# Patient Record
Sex: FEMALE | Race: ASIAN | NOT HISPANIC OR LATINO | Employment: FULL TIME
[De-identification: names, ages, dates, MRNs, and addresses within clinical notes are randomized per-mention and may not be internally consistent; named-entity substitution may affect disease eponyms.]

---

## 2017-05-18 ENCOUNTER — HOSPITAL (OUTPATIENT)
Dept: OTHER | Age: 47
End: 2017-05-18
Attending: OBSTETRICS & GYNECOLOGY

## 2017-09-18 ENCOUNTER — PRIOR ORIGINAL RECORDS (OUTPATIENT)
Dept: OTHER | Age: 47
End: 2017-09-18

## 2018-09-21 ENCOUNTER — PRIOR ORIGINAL RECORDS (OUTPATIENT)
Dept: OTHER | Age: 48
End: 2018-09-21

## 2019-01-04 ENCOUNTER — PRIOR ORIGINAL RECORDS (OUTPATIENT)
Dept: OTHER | Age: 49
End: 2019-01-04

## 2019-01-15 ENCOUNTER — MYAURORA ACCOUNT LINK (OUTPATIENT)
Dept: OTHER | Age: 49
End: 2019-01-15

## 2019-01-15 ENCOUNTER — PRIOR ORIGINAL RECORDS (OUTPATIENT)
Dept: OTHER | Age: 49
End: 2019-01-15

## 2019-01-15 LAB
ALBUMIN: 4.2 G/DL
ALKALINE PHOSPHATATE(ALK PHOS): 76 IU/L
BILIRUBIN TOTAL: 0.3 MG/DL
BUN: 15 MG/DL
CALCIUM: 9.5 MG/DL
CHLORIDE: 101 MEQ/L
CREATININE, SERUM: 0.61 MG/DL
GLUCOSE: 153 MG/DL
POTASSIUM, SERUM: 3.8 MEQ/L
PROTEIN, TOTAL: 7 G/DL
SGOT (AST): 23 IU/L
SGPT (ALT): 34 IU/L
SODIUM: 138 MEQ/L

## 2019-01-21 LAB
ALBUMIN: 4.1 G/DL
ALKALINE PHOSPHATATE(ALK PHOS): 48 IU/L
BILIRUBIN TOTAL: 0.7 MG/DL
BUN: 11 MG/DL
CALCIUM: 9.1 MG/DL
CHLORIDE: 102 MEQ/L
CHOLESTEROL, TOTAL: 163 MG/DL
CHOLESTEROL, TOTAL: 216 MG/DL
CREATININE, SERUM: 0.68 MG/DL
GLUCOSE: 105 MG/DL
HDL CHOLESTEROL: 38 MG/DL
HDL CHOLESTEROL: 40 MG/DL
HEMATOCRIT: 41.7 %
HEMOGLOBIN: 14.5 G/DL
LDL CHOLESTEROL: 103 MG/DL
LDL CHOLESTEROL: 144 MG/DL
NON-HDL CHOLESTEROL: 123 MG/DL
NON-HDL CHOLESTEROL: 178 MG/DL
PLATELETS: 249 K/UL
POTASSIUM, SERUM: 3.8 MEQ/L
PROTEIN, TOTAL: 7 G/DL
RED BLOOD COUNT: 4.75 X 10-6/U
SGOT (AST): 24 IU/L
SGPT (ALT): 28 IU/L
SODIUM: 136 MEQ/L
TRIGLYCERIDES: 128 MG/DL
TRIGLYCERIDES: 215 MG/DL
WHITE BLOOD COUNT: 5 X 10-3/U

## 2019-01-22 ENCOUNTER — MYAURORA ACCOUNT LINK (OUTPATIENT)
Dept: OTHER | Age: 49
End: 2019-01-22

## 2019-01-23 ENCOUNTER — PRIOR ORIGINAL RECORDS (OUTPATIENT)
Dept: OTHER | Age: 49
End: 2019-01-23

## 2019-02-22 ENCOUNTER — PRIOR ORIGINAL RECORDS (OUTPATIENT)
Dept: OTHER | Age: 49
End: 2019-02-22

## 2019-02-26 LAB
ALT (SGPT): 40 U/L
AST (SGOT): 23 U/L
CHOLESTEROL, TOTAL: 197 MG/DL
HDL CHOLESTEROL: 48 MG/DL
LDL CHOLESTEROL: 124 MG/DL
TRIGLYCERIDES: 123 MG/DL

## 2019-02-28 ENCOUNTER — PRIOR ORIGINAL RECORDS (OUTPATIENT)
Dept: OTHER | Age: 49
End: 2019-02-28

## 2019-02-28 VITALS
DIASTOLIC BLOOD PRESSURE: 84 MMHG | WEIGHT: 160 LBS | HEART RATE: 76 BPM | BODY MASS INDEX: 27.46 KG/M2 | SYSTOLIC BLOOD PRESSURE: 130 MMHG

## 2019-03-07 VITALS
DIASTOLIC BLOOD PRESSURE: 70 MMHG | RESPIRATION RATE: 16 BRPM | BODY MASS INDEX: 25.95 KG/M2 | SYSTOLIC BLOOD PRESSURE: 110 MMHG | WEIGHT: 152 LBS | HEART RATE: 72 BPM | HEIGHT: 64 IN

## 2020-12-22 ENCOUNTER — IMAGING SERVICES (OUTPATIENT)
Dept: OTHER | Age: 50
End: 2020-12-22
Attending: INTERNAL MEDICINE

## 2020-12-22 LAB — HM MAMMOGRAPHY BILATERAL: NORMAL

## 2021-08-18 ENCOUNTER — TELEPHONE (OUTPATIENT)
Dept: SCHEDULING | Age: 51
End: 2021-08-18

## 2021-09-29 ENCOUNTER — APPOINTMENT (OUTPATIENT)
Dept: FAMILY MEDICINE | Age: 51
End: 2021-09-29

## 2021-10-06 ENCOUNTER — OFFICE VISIT (OUTPATIENT)
Dept: INTERNAL MEDICINE | Age: 51
End: 2021-10-06

## 2021-10-06 ENCOUNTER — TELEPHONE (OUTPATIENT)
Dept: SCHEDULING | Age: 51
End: 2021-10-06

## 2021-10-06 VITALS
RESPIRATION RATE: 16 BRPM | HEIGHT: 64 IN | BODY MASS INDEX: 26.8 KG/M2 | DIASTOLIC BLOOD PRESSURE: 82 MMHG | SYSTOLIC BLOOD PRESSURE: 122 MMHG | HEART RATE: 94 BPM | TEMPERATURE: 96.1 F | OXYGEN SATURATION: 96 % | WEIGHT: 157 LBS

## 2021-10-06 DIAGNOSIS — Z23 NEED FOR PNEUMOCOCCAL VACCINATION: ICD-10-CM

## 2021-10-06 DIAGNOSIS — Z12.11 SCREENING FOR COLORECTAL CANCER: ICD-10-CM

## 2021-10-06 DIAGNOSIS — Z12.31 ENCOUNTER FOR SCREENING MAMMOGRAM FOR MALIGNANT NEOPLASM OF BREAST: ICD-10-CM

## 2021-10-06 DIAGNOSIS — R53.83 FATIGUE, UNSPECIFIED TYPE: ICD-10-CM

## 2021-10-06 DIAGNOSIS — Z01.419 WELL WOMAN EXAM WITH ROUTINE GYNECOLOGICAL EXAM: ICD-10-CM

## 2021-10-06 DIAGNOSIS — F51.01 PRIMARY INSOMNIA: ICD-10-CM

## 2021-10-06 DIAGNOSIS — E78.9 LIPID DISORDER: ICD-10-CM

## 2021-10-06 DIAGNOSIS — Z12.12 SCREENING FOR COLORECTAL CANCER: ICD-10-CM

## 2021-10-06 DIAGNOSIS — Z23 INFLUENZA VACCINE NEEDED: Primary | ICD-10-CM

## 2021-10-06 PROCEDURE — 90472 IMMUNIZATION ADMIN EACH ADD: CPT

## 2021-10-06 PROCEDURE — 90471 IMMUNIZATION ADMIN: CPT

## 2021-10-06 PROCEDURE — 90686 IIV4 VACC NO PRSV 0.5 ML IM: CPT

## 2021-10-06 PROCEDURE — 99386 PREV VISIT NEW AGE 40-64: CPT | Performed by: INTERNAL MEDICINE

## 2021-10-06 PROCEDURE — 90732 PPSV23 VACC 2 YRS+ SUBQ/IM: CPT

## 2021-10-06 RX ORDER — PSEUDOEPHEDRINE HCL 30 MG
TABLET ORAL
COMMUNITY
End: 2021-12-15 | Stop reason: HOSPADM

## 2021-10-06 RX ORDER — FLUTICASONE PROPIONATE AND SALMETEROL 50; 250 UG/1; UG/1
1 POWDER RESPIRATORY (INHALATION)
Qty: 1 EACH | Refills: 11 | Status: SHIPPED | OUTPATIENT
Start: 2021-10-06 | End: 2022-02-16 | Stop reason: SDUPTHER

## 2021-10-06 RX ORDER — LOSARTAN POTASSIUM 50 MG/1
TABLET ORAL
COMMUNITY
Start: 2021-06-30 | End: 2021-10-06 | Stop reason: SDUPTHER

## 2021-10-06 RX ORDER — LEVALBUTEROL INHALATION SOLUTION 1.25 MG/3ML
SOLUTION RESPIRATORY (INHALATION)
COMMUNITY
End: 2021-12-15 | Stop reason: HOSPADM

## 2021-10-06 RX ORDER — CALCIUM CARBONATE 500(1250)
500 TABLET ORAL
COMMUNITY
Start: 2019-01-15 | End: 2021-12-15 | Stop reason: HOSPADM

## 2021-10-06 RX ORDER — HYDROCHLOROTHIAZIDE 12.5 MG/1
TABLET ORAL
COMMUNITY
End: 2021-12-15 | Stop reason: HOSPADM

## 2021-10-06 RX ORDER — SERTRALINE HYDROCHLORIDE 25 MG/1
TABLET, FILM COATED ORAL
COMMUNITY
Start: 2021-07-09 | End: 2021-12-15 | Stop reason: HOSPADM

## 2021-10-06 RX ORDER — ALBUTEROL SULFATE 90 UG/1
POWDER, METERED RESPIRATORY (INHALATION)
COMMUNITY

## 2021-10-06 RX ORDER — ALBUTEROL SULFATE 90 UG/1
1 AEROSOL, METERED RESPIRATORY (INHALATION) EVERY 4 HOURS PRN
Qty: 1 EACH | Refills: 11 | Status: SHIPPED | OUTPATIENT
Start: 2021-10-06 | End: 2021-12-15 | Stop reason: HOSPADM

## 2021-10-06 RX ORDER — MULTIVIT-MIN/IRON/FOLIC ACID/K 18-600-40
CAPSULE ORAL
COMMUNITY

## 2021-10-06 RX ORDER — ZALEPLON 5 MG/1
5 CAPSULE ORAL NIGHTLY
Qty: 30 CAPSULE | Refills: 3 | Status: SHIPPED | OUTPATIENT
Start: 2021-10-06 | End: 2022-04-29

## 2021-10-06 RX ORDER — FLUTICASONE PROPIONATE AND SALMETEROL 50; 250 UG/1; UG/1
POWDER RESPIRATORY (INHALATION)
COMMUNITY
Start: 2021-09-30 | End: 2021-10-06 | Stop reason: SDUPTHER

## 2021-10-06 RX ORDER — LOSARTAN POTASSIUM 50 MG/1
50 TABLET ORAL DAILY
Qty: 90 TABLET | Refills: 3 | Status: SHIPPED | OUTPATIENT
Start: 2021-10-06 | End: 2022-06-28 | Stop reason: SDUPTHER

## 2021-10-06 RX ORDER — PNV NO.95/FERROUS FUM/FOLIC AC 28MG-0.8MG
TABLET ORAL
COMMUNITY

## 2021-10-06 RX ORDER — ANTACID TABLETS 500 MG/1
TABLET, CHEWABLE ORAL
COMMUNITY
End: 2021-12-15 | Stop reason: HOSPADM

## 2021-10-06 RX ORDER — FLUTICASONE PROPIONATE AND SALMETEROL 250; 50 UG/1; UG/1
POWDER RESPIRATORY (INHALATION)
COMMUNITY
Start: 2019-01-15 | End: 2021-12-15 | Stop reason: HOSPADM

## 2021-10-06 RX ORDER — FLUTICASONE PROPIONATE 50 MCG
SPRAY, SUSPENSION (ML) NASAL
COMMUNITY
Start: 2019-01-15

## 2021-10-06 RX ORDER — AZITHROMYCIN 250 MG/1
TABLET, FILM COATED ORAL
Qty: 6 TABLET | Refills: 0 | Status: SHIPPED | OUTPATIENT
Start: 2021-10-06 | End: 2021-10-11

## 2021-10-06 RX ORDER — ZALEPLON 5 MG/1
CAPSULE ORAL
COMMUNITY
Start: 2021-07-06 | End: 2021-10-06 | Stop reason: SDUPTHER

## 2021-10-06 RX ORDER — LANOLIN ALCOHOL/MO/W.PET/CERES
CREAM (GRAM) TOPICAL
COMMUNITY

## 2021-10-06 RX ORDER — ALBUTEROL SULFATE 90 UG/1
AEROSOL, METERED RESPIRATORY (INHALATION)
COMMUNITY
Start: 2019-01-15 | End: 2021-10-06 | Stop reason: SDUPTHER

## 2021-10-06 ASSESSMENT — PATIENT HEALTH QUESTIONNAIRE - PHQ9
CLINICAL INTERPRETATION OF PHQ2 SCORE: NO FURTHER SCREENING NEEDED
SUM OF ALL RESPONSES TO PHQ9 QUESTIONS 1 AND 2: 0
2. FEELING DOWN, DEPRESSED OR HOPELESS: NOT AT ALL
CLINICAL INTERPRETATION OF PHQ9 SCORE: NO FURTHER SCREENING NEEDED
SUM OF ALL RESPONSES TO PHQ9 QUESTIONS 1 AND 2: 0
1. LITTLE INTEREST OR PLEASURE IN DOING THINGS: NOT AT ALL

## 2021-10-06 ASSESSMENT — PAIN SCALES - GENERAL: PAINLEVEL: 0

## 2021-10-08 ENCOUNTER — E-ADVICE (OUTPATIENT)
Dept: INTERNAL MEDICINE | Age: 51
End: 2021-10-08

## 2021-10-08 DIAGNOSIS — E55.9 VITAMIN D DEFICIENCY: Primary | ICD-10-CM

## 2021-12-15 ENCOUNTER — OFFICE VISIT (OUTPATIENT)
Dept: OBGYN | Age: 51
End: 2021-12-15

## 2021-12-15 VITALS
BODY MASS INDEX: 27.33 KG/M2 | DIASTOLIC BLOOD PRESSURE: 85 MMHG | SYSTOLIC BLOOD PRESSURE: 127 MMHG | WEIGHT: 160.05 LBS | HEART RATE: 75 BPM | HEIGHT: 64 IN

## 2021-12-15 DIAGNOSIS — Z01.419 GYNECOLOGIC EXAM NORMAL: Primary | ICD-10-CM

## 2021-12-15 DIAGNOSIS — Z80.41 FHX: OVARIAN CANCER: ICD-10-CM

## 2021-12-15 PROBLEM — J45.909 ASTHMA: Status: ACTIVE | Noted: 2021-12-15

## 2021-12-15 PROBLEM — G47.00 INSOMNIA: Status: ACTIVE | Noted: 2021-12-15

## 2021-12-15 PROBLEM — G47.30 SLEEP APNEA: Status: ACTIVE | Noted: 2021-12-15

## 2021-12-15 PROBLEM — I10 HYPERTENSION: Status: ACTIVE | Noted: 2021-12-15

## 2021-12-15 PROCEDURE — 99386 PREV VISIT NEW AGE 40-64: CPT | Performed by: OBSTETRICS & GYNECOLOGY

## 2021-12-15 PROCEDURE — 3074F SYST BP LT 130 MM HG: CPT | Performed by: OBSTETRICS & GYNECOLOGY

## 2021-12-15 PROCEDURE — 3079F DIAST BP 80-89 MM HG: CPT | Performed by: OBSTETRICS & GYNECOLOGY

## 2021-12-18 ENCOUNTER — HOSPITAL ENCOUNTER (OUTPATIENT)
Dept: MAMMOGRAPHY | Age: 51
Discharge: HOME | End: 2021-12-18
Attending: INTERNAL MEDICINE

## 2021-12-18 DIAGNOSIS — Z12.31 ENCOUNTER FOR SCREENING MAMMOGRAM FOR MALIGNANT NEOPLASM OF BREAST: ICD-10-CM

## 2021-12-18 PROCEDURE — 77063 BREAST TOMOSYNTHESIS BI: CPT

## 2021-12-26 ENCOUNTER — WALK IN (OUTPATIENT)
Dept: URGENT CARE | Age: 51
End: 2021-12-26

## 2021-12-26 VITALS
HEART RATE: 117 BPM | HEIGHT: 64 IN | DIASTOLIC BLOOD PRESSURE: 65 MMHG | OXYGEN SATURATION: 98 % | SYSTOLIC BLOOD PRESSURE: 140 MMHG | RESPIRATION RATE: 16 BRPM | TEMPERATURE: 99.3 F | WEIGHT: 157 LBS | BODY MASS INDEX: 26.8 KG/M2

## 2021-12-26 DIAGNOSIS — R50.9 FEVER AND CHILLS: ICD-10-CM

## 2021-12-26 DIAGNOSIS — R53.83 FATIGUE, UNSPECIFIED TYPE: ICD-10-CM

## 2021-12-26 DIAGNOSIS — R09.81 NASAL CONGESTION: ICD-10-CM

## 2021-12-26 DIAGNOSIS — U07.1 COVID-19 VIRUS DETECTED: Primary | ICD-10-CM

## 2021-12-26 DIAGNOSIS — Z20.818 EXPOSURE TO STREP THROAT: ICD-10-CM

## 2021-12-26 DIAGNOSIS — R05.9 COUGH: ICD-10-CM

## 2021-12-26 LAB
FLUAV AG UPPER RESP QL IA.RAPID: NEGATIVE
FLUBV AG UPPER RESP QL IA.RAPID: NEGATIVE
INTERNAL PROCEDURAL CONTROLS ACCEPTABLE: YES
S PYO AG THROAT QL IA.RAPID: NEGATIVE
SARS-COV+SARS-COV-2 AG RESP QL IA.RAPID: DETECTED

## 2021-12-26 PROCEDURE — 3077F SYST BP >= 140 MM HG: CPT | Performed by: NURSE PRACTITIONER

## 2021-12-26 PROCEDURE — 87880 STREP A ASSAY W/OPTIC: CPT | Performed by: NURSE PRACTITIONER

## 2021-12-26 PROCEDURE — 87426 SARSCOV CORONAVIRUS AG IA: CPT | Performed by: NURSE PRACTITIONER

## 2021-12-26 PROCEDURE — 99203 OFFICE O/P NEW LOW 30 MIN: CPT | Performed by: NURSE PRACTITIONER

## 2021-12-26 PROCEDURE — 3078F DIAST BP <80 MM HG: CPT | Performed by: NURSE PRACTITIONER

## 2021-12-26 PROCEDURE — 87804 INFLUENZA ASSAY W/OPTIC: CPT | Performed by: NURSE PRACTITIONER

## 2021-12-26 RX ORDER — BENZONATATE 100 MG/1
100 CAPSULE ORAL 3 TIMES DAILY PRN
Qty: 21 CAPSULE | Refills: 0 | Status: SHIPPED | OUTPATIENT
Start: 2021-12-26 | End: 2022-01-02

## 2021-12-26 ASSESSMENT — ENCOUNTER SYMPTOMS
CHILLS: 1
ABDOMINAL DISTENTION: 0
DIARRHEA: 0
COUGH: 1
ACTIVITY CHANGE: 0
RHINORRHEA: 0
DIZZINESS: 0
CHEST TIGHTNESS: 0
WEAKNESS: 0
NERVOUS/ANXIOUS: 0
VOMITING: 0
SORE THROAT: 0
SINUS PRESSURE: 0
BLOOD IN STOOL: 0
VOICE CHANGE: 0
EYE REDNESS: 0
FATIGUE: 1
SLEEP DISTURBANCE: 0
FEVER: 1
BACK PAIN: 0
TROUBLE SWALLOWING: 0
WHEEZING: 0
SHORTNESS OF BREATH: 0
HEADACHES: 0
LIGHT-HEADEDNESS: 0
ABDOMINAL PAIN: 0
NAUSEA: 0
APPETITE CHANGE: 0

## 2021-12-29 ENCOUNTER — APPOINTMENT (OUTPATIENT)
Dept: MAMMOGRAPHY | Age: 51
End: 2021-12-29
Attending: INTERNAL MEDICINE

## 2021-12-31 ENCOUNTER — TELEPHONE (OUTPATIENT)
Dept: SCHEDULING | Age: 51
End: 2021-12-31

## 2022-01-19 DIAGNOSIS — Z11.59 SCREENING EXAMINATION FOR OTHER ARTHROPOD-BORNE VIRAL DISEASES: Primary | ICD-10-CM

## 2022-01-21 ENCOUNTER — LAB SERVICES (OUTPATIENT)
Dept: LAB | Age: 52
End: 2022-01-21

## 2022-01-21 DIAGNOSIS — E78.9 LIPID DISORDER: Primary | ICD-10-CM

## 2022-01-21 LAB
25(OH)D3+25(OH)D2 SERPL-MCNC: 44.4 NG/ML (ref 30–100)
ALBUMIN SERPL-MCNC: 3.7 G/DL (ref 3.6–5.1)
ALBUMIN/GLOB SERPL: 1 {RATIO} (ref 1–2.4)
ALP SERPL-CCNC: 90 UNITS/L (ref 45–117)
ALT SERPL-CCNC: 32 UNITS/L
ANION GAP SERPL CALC-SCNC: 8 MMOL/L (ref 10–20)
AST SERPL-CCNC: 16 UNITS/L
BILIRUB SERPL-MCNC: 0.5 MG/DL (ref 0.2–1)
BUN SERPL-MCNC: 14 MG/DL (ref 6–20)
BUN/CREAT SERPL: 21 (ref 7–25)
CALCIUM SERPL-MCNC: 9 MG/DL (ref 8.4–10.2)
CHLORIDE SERPL-SCNC: 106 MMOL/L (ref 98–107)
CHOLEST SERPL-MCNC: 205 MG/DL
CHOLEST/HDLC SERPL: 4.2 {RATIO}
CO2 SERPL-SCNC: 28 MMOL/L (ref 21–32)
CREAT SERPL-MCNC: 0.68 MG/DL (ref 0.51–0.95)
FASTING DURATION TIME PATIENT: ABNORMAL H
FASTING DURATION TIME PATIENT: ABNORMAL H
GFR SERPLBLD BASED ON 1.73 SQ M-ARVRAT: >90 ML/MIN
GLOBULIN SER-MCNC: 3.6 G/DL (ref 2–4)
GLUCOSE SERPL-MCNC: 103 MG/DL (ref 70–99)
HDLC SERPL-MCNC: 49 MG/DL
LDLC SERPL CALC-MCNC: 119 MG/DL
NONHDLC SERPL-MCNC: 156 MG/DL
POTASSIUM SERPL-SCNC: 3.6 MMOL/L (ref 3.4–5.1)
PROT SERPL-MCNC: 7.3 G/DL (ref 6.4–8.2)
SODIUM SERPL-SCNC: 138 MMOL/L (ref 135–145)
TRIGL SERPL-MCNC: 183 MG/DL

## 2022-01-21 PROCEDURE — 36415 COLL VENOUS BLD VENIPUNCTURE: CPT | Performed by: PSYCHIATRY & NEUROLOGY

## 2022-01-21 PROCEDURE — 82306 VITAMIN D 25 HYDROXY: CPT | Performed by: PSYCHIATRY & NEUROLOGY

## 2022-01-21 PROCEDURE — 80053 COMPREHEN METABOLIC PANEL: CPT | Performed by: PSYCHIATRY & NEUROLOGY

## 2022-01-21 PROCEDURE — 80061 LIPID PANEL: CPT | Performed by: PSYCHIATRY & NEUROLOGY

## 2022-01-26 ENCOUNTER — EXTERNAL RECORD (OUTPATIENT)
Dept: HEALTH INFORMATION MANAGEMENT | Facility: OTHER | Age: 52
End: 2022-01-26

## 2022-01-26 PROCEDURE — 88305 TISSUE EXAM BY PATHOLOGIST: CPT | Performed by: CLINICAL MEDICAL LABORATORY

## 2022-01-27 ENCOUNTER — LAB REQUISITION (OUTPATIENT)
Dept: LAB | Age: 52
End: 2022-01-27

## 2022-01-27 DIAGNOSIS — Z12.11 ENCOUNTER FOR SCREENING FOR MALIGNANT NEOPLASM OF COLON: ICD-10-CM

## 2022-01-31 LAB
ASR DISCLAIMER: NORMAL
CASE RPRT: NORMAL
CLINICAL INFO: NORMAL
PATH REPORT.FINAL DX SPEC: NORMAL
PATH REPORT.GROSS SPEC: NORMAL

## 2022-02-16 RX ORDER — FLUTICASONE PROPIONATE AND SALMETEROL 50; 250 UG/1; UG/1
1 POWDER RESPIRATORY (INHALATION)
Qty: 1 EACH | Refills: 11 | Status: SHIPPED | OUTPATIENT
Start: 2022-02-16 | End: 2022-03-28 | Stop reason: SDUPTHER

## 2022-03-28 DIAGNOSIS — J45.909 MILD ASTHMA, UNSPECIFIED WHETHER COMPLICATED, UNSPECIFIED WHETHER PERSISTENT: Primary | ICD-10-CM

## 2022-03-28 RX ORDER — FLUTICASONE PROPIONATE AND SALMETEROL 50; 250 UG/1; UG/1
1 POWDER RESPIRATORY (INHALATION)
Qty: 3 EACH | Refills: 0 | Status: SHIPPED | OUTPATIENT
Start: 2022-03-28

## 2022-04-29 RX ORDER — ZALEPLON 5 MG/1
CAPSULE ORAL
Qty: 30 CAPSULE | Refills: 3 | Status: SHIPPED | OUTPATIENT
Start: 2022-04-29

## 2022-05-04 RX ORDER — ZALEPLON 5 MG/1
5 CAPSULE ORAL NIGHTLY
Qty: 30 CAPSULE | Refills: 3 | Status: CANCELLED | OUTPATIENT
Start: 2022-05-04

## 2022-06-27 ENCOUNTER — NURSE ONLY (OUTPATIENT)
Dept: INTEGRATIVE MEDICINE | Facility: CLINIC | Age: 52
End: 2022-06-27

## 2022-06-28 NOTE — PROGRESS NOTES
Reiki Intake and Documentation    Patient Name: Paty Meneses   YOB: 1970   Medical Record Number: [id number]   CSN: [id number]     Date of Visit: 6/27/2022    Reason for scheduling reiki session: No chief complaint on file. Name of referring physician: No ref. provider found  Self-referred  Client's description of overall health status: Client states that she has history of asthma and sleep apnea. 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 Past two years have been difficult especially with the need for additional cleaning between patient. \"I think all the chemicals in the cleaning supplies have affected me. \" Complains of generalized body aches. States that she exercises regularly, has taken yoga classes, meditation. Seeks \"alternative practices\" to support a healthy lifestyle. Client's major concern(s) regarding health: Primary concern is sleep issues. Has a difficult time falling asleep at night. Has tried a variety of practices to promote sleep. \"I'm tired and nothing seems to work very well for me, consistently. \"  Client is new to Reiki. Instructed client regarding nature of Reiki and how session is conducted. Reiki practitioner observation / treatment: Session began with series of head to toe sweeps, deep breaths and short guided imagery to promote relaxation. Byosen (scale 1-5) noted at crown/heart/abdomen at level 3. Focused work to these areas. Good energetic connection. Client was restless through out session. Client experience / post session evaluation: Client described relaxation and feelings of waves of warmth down her body. Stated that she felt more calm and peaceful. Feelings of stress decreased from level 8 to level 2. She stated that her body is often restless at night. \"I have feelings of inner restlessness. \"  Client described herself as an empath. \"I feel other people's pain and emotions very strongly. \"  Discussed her experiences as an empath. Taught client hand positions for self-care and protection. Gave client handouts about self-care for empaths. Emotional support offered. Gave client further information about Reiki and aftercare from sessions. Recommended additional sessions. Intake Form Data:  Referral Source: Self referred  Pre-Reiki Therapy (Pain): 2  Pre-Reiki Therapy (Anxiety): 8     Post-Reiki Therapy (Pain): 1  Post-Reiki Therapy (Anxiety):  2    Electronically Signed by:    Doris Platt RN, 6/27/2022     Epic Template #[id number]

## 2022-06-29 RX ORDER — LOSARTAN POTASSIUM 50 MG/1
50 TABLET ORAL DAILY
Qty: 90 TABLET | Refills: 0 | Status: SHIPPED | OUTPATIENT
Start: 2022-06-29 | End: 2022-10-03

## 2022-07-18 ENCOUNTER — NURSE ONLY (OUTPATIENT)
Dept: INTEGRATIVE MEDICINE | Facility: CLINIC | Age: 52
End: 2022-07-18

## 2022-07-19 NOTE — PROGRESS NOTES
Reiki Follow Up Note    Patient Name: Deepa Gloria   YOB: 1970   Medical Record Number: [id number]   CSN: [id number]     Date of Visit: 7/18/2022    Reason for scheduling reiki session: No chief complaint on file. Name of referring physician: No ref. provider found    Overall health status: Client stated that she felt more peaceful after initial Reiki session last week. Sleep slightly better. Just came from busy work day. No questions after session last week. Observation: Session began with series of head to toe sweeps, three deep breaths and guided imagery to promote relaxation. Byosen (scale 1-5) noted at heart / abdomen at level three; root area at level 3. Focused work to these areas. Client was restless at beginning of session then rested quietly for last 20 minutes. Appeared to sleep for brief period. Client Experience: States that she feels very relaxed after session. States she was somewhat surprised that she was able to be still and rest, especially after busy work day. States that she is working to return to her yoga practice. Realizes that modalities like yoga and Reiki can contribute to restful sleep and that it may take time to for these practices to really take effect. Emotional support offered. Will notify client of dates of upcoming Reiki classes. Return for session prn.      Intake Form Data:                      Electronically Signed by:    Vi Figueroa RN, 7/18/2022     Epic Template #[id number]

## 2022-10-03 RX ORDER — LOSARTAN POTASSIUM 50 MG/1
TABLET ORAL
Qty: 90 TABLET | Refills: 0 | Status: SHIPPED | OUTPATIENT
Start: 2022-10-03

## 2022-10-25 ENCOUNTER — OFFICE VISIT (OUTPATIENT)
Dept: INTEGRATIVE MEDICINE | Facility: CLINIC | Age: 52
End: 2022-10-25
Payer: COMMERCIAL

## 2022-10-25 ENCOUNTER — PATIENT MESSAGE (OUTPATIENT)
Dept: INTEGRATIVE MEDICINE | Facility: CLINIC | Age: 52
End: 2022-10-25

## 2022-10-25 VITALS
DIASTOLIC BLOOD PRESSURE: 84 MMHG | WEIGHT: 146 LBS | SYSTOLIC BLOOD PRESSURE: 142 MMHG | OXYGEN SATURATION: 98 % | HEART RATE: 96 BPM

## 2022-10-25 DIAGNOSIS — Z12.31 SCREENING MAMMOGRAM, ENCOUNTER FOR: ICD-10-CM

## 2022-10-25 DIAGNOSIS — R35.0 INCREASED URINARY FREQUENCY: ICD-10-CM

## 2022-10-25 DIAGNOSIS — R63.4 WEIGHT LOSS: ICD-10-CM

## 2022-10-25 DIAGNOSIS — Z00.00 WELL ADULT EXAM: Primary | ICD-10-CM

## 2022-10-25 DIAGNOSIS — R73.01 ELEVATED FASTING BLOOD SUGAR: ICD-10-CM

## 2022-10-25 DIAGNOSIS — N91.2 AMENORRHEA: ICD-10-CM

## 2022-10-25 DIAGNOSIS — E78.5 HYPERLIPIDEMIA, UNSPECIFIED HYPERLIPIDEMIA TYPE: ICD-10-CM

## 2022-10-25 PROCEDURE — 99203 OFFICE O/P NEW LOW 30 MIN: CPT | Performed by: FAMILY MEDICINE

## 2022-10-25 PROCEDURE — 3079F DIAST BP 80-89 MM HG: CPT | Performed by: FAMILY MEDICINE

## 2022-10-25 PROCEDURE — 3077F SYST BP >= 140 MM HG: CPT | Performed by: FAMILY MEDICINE

## 2022-10-25 PROCEDURE — 99386 PREV VISIT NEW AGE 40-64: CPT | Performed by: FAMILY MEDICINE

## 2022-10-25 RX ORDER — ZALEPLON 5 MG/1
CAPSULE ORAL
COMMUNITY
Start: 2021-07-06

## 2022-10-25 RX ORDER — LEVALBUTEROL INHALATION SOLUTION 1.25 MG/3ML
SOLUTION RESPIRATORY (INHALATION)
COMMUNITY
Start: 2020-10-22

## 2022-10-25 RX ORDER — FLUTICASONE PROPIONATE 50 MCG
SPRAY, SUSPENSION (ML) NASAL
COMMUNITY

## 2022-10-25 RX ORDER — LOSARTAN POTASSIUM 50 MG/1
50 TABLET ORAL DAILY
COMMUNITY
Start: 2022-10-03

## 2022-10-25 RX ORDER — MELATONIN: COMMUNITY

## 2022-10-28 NOTE — TELEPHONE ENCOUNTER
From: Grockit  To: Zhane Hanson DO  Sent: 10/25/2022 8:02 PM CDT  Subject: Need Medication renewal/ Interactions    Hi Dr. Maria Luisa Madrigal,   I was overwhelmed information about the added medication. I forgot to remind important meds to renewal.  Can you e-scribed renewal prescription Losartan 50mg (Htn) every day and Advair 250/50 bid ( Asthma) 3months and albuterol as needed (3mos)? Please send [facility] on my file. Right now I am good for couple months. My asthma and high blood pressure well controlled. I know suggested vitamins to buy Where do you suggest to buy them ? [facility], recommended drug store) . Unfortunately I can not use [facility] online dispensary for the practice. By taking Thorn Basic B, Neuro Mag, Michael Rather have any issues when I take my losartan and the other vitamin I take vit D, Calcium, and fish oil supplements.    Thank you for you time  Grockit

## 2022-11-01 NOTE — TELEPHONE ENCOUNTER
Returned call, left message on RN line. Requesting 3 month supply [facility]artan [facility], Advair Albuterol - [facility].     Thank you

## 2022-11-02 ENCOUNTER — TELEPHONE (OUTPATIENT)
Dept: INTEGRATIVE MEDICINE | Facility: CLINIC | Age: 52
End: 2022-11-02

## 2022-11-02 NOTE — TELEPHONE ENCOUNTER
Pt's list of medication for Dr. Lemuel Rios - 50 mg once a day daily    Advair - 250-50 mg 2 x a day   Albuterol - as needed    Pt requested a 3 month supply for all of the above.

## 2022-11-04 ENCOUNTER — LAB ENCOUNTER (OUTPATIENT)
Dept: LAB | Facility: HOSPITAL | Age: 52
End: 2022-11-04
Attending: FAMILY MEDICINE
Payer: COMMERCIAL

## 2022-11-04 ENCOUNTER — OFFICE VISIT (OUTPATIENT)
Dept: OBGYN CLINIC | Facility: CLINIC | Age: 52
End: 2022-11-04
Payer: COMMERCIAL

## 2022-11-04 VITALS
HEIGHT: 64 IN | DIASTOLIC BLOOD PRESSURE: 84 MMHG | SYSTOLIC BLOOD PRESSURE: 136 MMHG | BODY MASS INDEX: 24.72 KG/M2 | WEIGHT: 144.81 LBS

## 2022-11-04 DIAGNOSIS — E78.5 HYPERLIPIDEMIA, UNSPECIFIED HYPERLIPIDEMIA TYPE: ICD-10-CM

## 2022-11-04 DIAGNOSIS — Z00.00 WELL ADULT EXAM: ICD-10-CM

## 2022-11-04 DIAGNOSIS — R73.01 ELEVATED FASTING BLOOD SUGAR: ICD-10-CM

## 2022-11-04 DIAGNOSIS — Z01.419 ENCOUNTER FOR ANNUAL ROUTINE GYNECOLOGICAL EXAMINATION: Primary | ICD-10-CM

## 2022-11-04 DIAGNOSIS — N91.2 AMENORRHEA: ICD-10-CM

## 2022-11-04 DIAGNOSIS — R35.0 INCREASED URINARY FREQUENCY: ICD-10-CM

## 2022-11-04 DIAGNOSIS — R63.4 WEIGHT LOSS: ICD-10-CM

## 2022-11-04 LAB
ALBUMIN SERPL-MCNC: 4.2 G/DL (ref 3.4–5)
ALBUMIN/GLOB SERPL: 1.2 {RATIO} (ref 1–2)
ALP LIVER SERPL-CCNC: 101 U/L
ALT SERPL-CCNC: 28 U/L
ANION GAP SERPL CALC-SCNC: 9 MMOL/L (ref 0–18)
AST SERPL-CCNC: 18 U/L (ref 15–37)
BASOPHILS # BLD AUTO: 0.06 X10(3) UL (ref 0–0.2)
BASOPHILS NFR BLD AUTO: 1 %
BILIRUB SERPL-MCNC: 0.6 MG/DL (ref 0.1–2)
BUN BLD-MCNC: 15 MG/DL (ref 7–18)
BUN/CREAT SERPL: 23.1 (ref 10–20)
CALCIUM BLD-MCNC: 9.3 MG/DL (ref 8.5–10.1)
CHLORIDE SERPL-SCNC: 104 MMOL/L (ref 98–112)
CHOLEST SERPL-MCNC: 175 MG/DL (ref ?–200)
CO2 SERPL-SCNC: 26 MMOL/L (ref 21–32)
CREAT BLD-MCNC: 0.65 MG/DL
DEPRECATED RDW RBC AUTO: 37.8 FL (ref 35.1–46.3)
DHEA-S SERPL-MCNC: 105.5 UG/DL
EOSINOPHIL # BLD AUTO: 0.11 X10(3) UL (ref 0–0.7)
EOSINOPHIL NFR BLD AUTO: 1.8 %
ERYTHROCYTE [DISTWIDTH] IN BLOOD BY AUTOMATED COUNT: 11.6 % (ref 11–15)
EST. AVERAGE GLUCOSE BLD GHB EST-MCNC: 114 MG/DL (ref 68–126)
FASTING PATIENT LIPID ANSWER: YES
FASTING STATUS PATIENT QL REPORTED: YES
FSH SERPL-ACNC: 45.5 MIU/ML
GFR SERPLBLD BASED ON 1.73 SQ M-ARVRAT: 107 ML/MIN/1.73M2 (ref 60–?)
GLOBULIN PLAS-MCNC: 3.4 G/DL (ref 2.8–4.4)
GLUCOSE BLD-MCNC: 121 MG/DL (ref 70–99)
HBA1C MFR BLD: 5.6 % (ref ?–5.7)
HCT VFR BLD AUTO: 44.9 %
HDLC SERPL-MCNC: 55 MG/DL (ref 40–59)
HGB BLD-MCNC: 15.2 G/DL
IMM GRANULOCYTES # BLD AUTO: 0.01 X10(3) UL (ref 0–1)
IMM GRANULOCYTES NFR BLD: 0.2 %
LDLC SERPL CALC-MCNC: 100 MG/DL (ref ?–100)
LYMPHOCYTES # BLD AUTO: 0.92 X10(3) UL (ref 1–4)
LYMPHOCYTES NFR BLD AUTO: 15.2 %
MCH RBC QN AUTO: 30.3 PG (ref 26–34)
MCHC RBC AUTO-ENTMCNC: 33.9 G/DL (ref 31–37)
MCV RBC AUTO: 89.4 FL
MONOCYTES # BLD AUTO: 0.31 X10(3) UL (ref 0.1–1)
MONOCYTES NFR BLD AUTO: 5.1 %
NEUTROPHILS # BLD AUTO: 4.63 X10 (3) UL (ref 1.5–7.7)
NEUTROPHILS # BLD AUTO: 4.63 X10(3) UL (ref 1.5–7.7)
NEUTROPHILS NFR BLD AUTO: 76.7 %
NONHDLC SERPL-MCNC: 120 MG/DL (ref ?–130)
OSMOLALITY SERPL CALC.SUM OF ELEC: 290 MOSM/KG (ref 275–295)
PLATELET # BLD AUTO: 221 10(3)UL (ref 150–450)
POTASSIUM SERPL-SCNC: 4 MMOL/L (ref 3.5–5.1)
PROGEST SERPL-MCNC: 0.58 NG/ML
PROT SERPL-MCNC: 7.6 G/DL (ref 6.4–8.2)
RBC # BLD AUTO: 5.02 X10(6)UL
SODIUM SERPL-SCNC: 139 MMOL/L (ref 136–145)
THYROGLOB SERPL-MCNC: <15 U/ML (ref ?–60)
THYROPEROXIDASE AB SERPL-ACNC: 51 U/ML (ref ?–60)
TRIGL SERPL-MCNC: 110 MG/DL (ref 30–149)
TSI SER-ACNC: 0.56 MIU/ML (ref 0.36–3.74)
VIT D+METAB SERPL-MCNC: 48.4 NG/ML (ref 30–100)
VLDLC SERPL CALC-MCNC: 18 MG/DL (ref 0–30)
WBC # BLD AUTO: 6 X10(3) UL (ref 4–11)

## 2022-11-04 PROCEDURE — 83001 ASSAY OF GONADOTROPIN (FSH): CPT

## 2022-11-04 PROCEDURE — 82306 VITAMIN D 25 HYDROXY: CPT

## 2022-11-04 PROCEDURE — 82627 DEHYDROEPIANDROSTERONE: CPT

## 2022-11-04 PROCEDURE — 84144 ASSAY OF PROGESTERONE: CPT

## 2022-11-04 PROCEDURE — 83036 HEMOGLOBIN GLYCOSYLATED A1C: CPT

## 2022-11-04 PROCEDURE — 99386 PREV VISIT NEW AGE 40-64: CPT | Performed by: OBSTETRICS & GYNECOLOGY

## 2022-11-04 PROCEDURE — 86376 MICROSOMAL ANTIBODY EACH: CPT

## 2022-11-04 PROCEDURE — 80053 COMPREHEN METABOLIC PANEL: CPT

## 2022-11-04 PROCEDURE — 85025 COMPLETE CBC W/AUTO DIFF WBC: CPT

## 2022-11-04 PROCEDURE — 84403 ASSAY OF TOTAL TESTOSTERONE: CPT

## 2022-11-04 PROCEDURE — 80061 LIPID PANEL: CPT

## 2022-11-04 PROCEDURE — 84443 ASSAY THYROID STIM HORMONE: CPT

## 2022-11-04 PROCEDURE — 84402 ASSAY OF FREE TESTOSTERONE: CPT

## 2022-11-04 PROCEDURE — 3075F SYST BP GE 130 - 139MM HG: CPT | Performed by: OBSTETRICS & GYNECOLOGY

## 2022-11-04 PROCEDURE — 82671 ASSAY OF ESTROGENS: CPT

## 2022-11-04 PROCEDURE — 36415 COLL VENOUS BLD VENIPUNCTURE: CPT

## 2022-11-04 PROCEDURE — 86800 THYROGLOBULIN ANTIBODY: CPT

## 2022-11-04 PROCEDURE — 3008F BODY MASS INDEX DOCD: CPT | Performed by: OBSTETRICS & GYNECOLOGY

## 2022-11-04 PROCEDURE — 3079F DIAST BP 80-89 MM HG: CPT | Performed by: OBSTETRICS & GYNECOLOGY

## 2022-11-04 RX ORDER — ALBUTEROL SULFATE 90 UG/1
1 AEROSOL, METERED RESPIRATORY (INHALATION) EVERY 4 HOURS PRN
Qty: 3 EACH | Refills: 1 | Status: SHIPPED | OUTPATIENT
Start: 2022-11-04

## 2022-11-04 RX ORDER — CHLORAL HYDRATE 500 MG
CAPSULE ORAL DAILY
COMMUNITY

## 2022-11-04 RX ORDER — FLUTICASONE PROPIONATE AND SALMETEROL 250; 50 UG/1; UG/1
1 POWDER RESPIRATORY (INHALATION) EVERY 12 HOURS SCHEDULED
Qty: 3 EACH | Refills: 1 | Status: SHIPPED | OUTPATIENT
Start: 2022-11-04

## 2022-11-04 NOTE — TELEPHONE ENCOUNTER
Losartan - [facility]       [facility] - [facility]air and albuterol never filled there. I s/w pharmacist at [facility] - 90 pills on 10/6 - losartan 50 mg every day. I am not refilling this - not due until jan/2023. Confirmed other scripts - [facility]. advair 250-50 bid    Albuterol HFA 90 - q 4 prn for sob.

## 2022-11-07 ENCOUNTER — MED REC SCAN ONLY (OUTPATIENT)
Dept: OBGYN CLINIC | Facility: CLINIC | Age: 52
End: 2022-11-07

## 2022-11-08 ENCOUNTER — TELEPHONE (OUTPATIENT)
Dept: INTEGRATIVE MEDICINE | Facility: CLINIC | Age: 52
End: 2022-11-08

## 2022-11-08 RX ORDER — FLUTICASONE PROPIONATE AND SALMETEROL 250; 50 UG/1; UG/1
1 POWDER RESPIRATORY (INHALATION) 2 TIMES DAILY
Qty: 14 EACH | Refills: 0 | Status: SHIPPED | OUTPATIENT
Start: 2022-11-08

## 2022-11-08 RX ORDER — SERTRALINE HYDROCHLORIDE 25 MG/1
25 TABLET, FILM COATED ORAL DAILY
COMMUNITY
Start: 2020-10-22

## 2022-11-08 RX ORDER — FLUTICASONE PROPIONATE 50 MCG
SPRAY, SUSPENSION (ML) NASAL
COMMUNITY

## 2022-11-08 RX ORDER — CETIRIZINE HYDROCHLORIDE 10 MG/1
10 TABLET ORAL
COMMUNITY

## 2022-11-08 RX ORDER — FLUTICASONE PROPIONATE AND SALMETEROL 250; 50 UG/1; UG/1
1 POWDER RESPIRATORY (INHALATION) 2 TIMES DAILY
COMMUNITY
Start: 2021-09-13 | End: 2022-11-08

## 2022-11-08 RX ORDER — LOSARTAN POTASSIUM 50 MG/1
50 TABLET ORAL DAILY
Qty: 90 TABLET | Refills: 0 | Status: SHIPPED | OUTPATIENT
Start: 2022-11-08

## 2022-11-08 RX ORDER — PREDNISONE 20 MG/1
TABLET ORAL
COMMUNITY
Start: 2020-10-22

## 2022-11-08 RX ORDER — MELATONIN: COMMUNITY

## 2022-11-08 RX ORDER — HYDROCHLOROTHIAZIDE 12.5 MG/1
12.5 CAPSULE, GELATIN COATED ORAL EVERY MORNING
COMMUNITY
Start: 2020-10-22

## 2022-11-08 RX ORDER — LOSARTAN POTASSIUM 50 MG/1
1 TABLET ORAL DAILY
COMMUNITY
Start: 2020-10-22

## 2022-11-08 NOTE — TELEPHONE ENCOUNTER
Patient came into office and presented forms to be completed for her insurance at [facility] (due by 12/1/22 (placed in bin at front).     Thank you

## 2022-11-10 LAB
ESTRADIOL BY TMS: 2.9 PG/ML
ESTROGENS TOTAL CALCULATION: 17.6 PG/ML
ESTRONE BY TMS: 14.7 PG/ML

## 2022-11-17 LAB
TESTOSTERONE, FREE, S: 0.42 NG/DL
TESTOSTERONE, TOTAL, S: 16 NG/DL

## 2022-11-18 ENCOUNTER — OFFICE VISIT (OUTPATIENT)
Dept: INTEGRATIVE MEDICINE | Facility: CLINIC | Age: 52
End: 2022-11-18
Payer: COMMERCIAL

## 2022-11-18 DIAGNOSIS — M54.2 NECK PAIN: ICD-10-CM

## 2022-11-18 DIAGNOSIS — R23.2 HOT FLASHES: Primary | ICD-10-CM

## 2022-11-18 PROCEDURE — 97811 ACUP 1/> W/O ESTIM EA ADD 15: CPT | Performed by: ACUPUNCTURIST

## 2022-11-18 PROCEDURE — 97810 ACUP 1/> WO ESTIM 1ST 15 MIN: CPT | Performed by: ACUPUNCTURIST

## 2022-11-18 NOTE — PROGRESS NOTES
Norris Haque is a 47year old female Acupuncture Therapy. Chief Complaint: Hot flashes  Secondary Complaint: Insomnia  Tertiary Complaint: Urinary Frequency     Self reported health status:     Patient reported severity of symptom(s) over the last 24 hours  With zero reporting no symptoms and 10 reporting the worst severity    Symptom 1: 5-6  Symptom 2: 3  Symptom 3: 4    Response to last TX: NA    HPI: Delores Fernandes suffers from hot flashes since beginning menopause over 1 year ago. The hot flashes are frequent and it keeps her up at night which has lead to the insomnia. Additionally, she has polyuria at night which has been going on for more than a year,  Her Bowel movements are loose and this has been worse since beginning magnesium as prescribed by  [address] [place] [address]. We discussed reducing the dose to see if this helps BM. Her stress is high and she tends to be anxious. Has a hx of a keloid. Is a pediatric nurse. Objective (Pulse, Tongue, Vitals): /78. Pulse is thin and choppy. Tongue is unremarkable but there was SLV distention. TCM Diagnosis: LR/FIONA treviño    Plan of Care: Acupuncture Therapy  First set: Bilateral ulloa men  Second set: RT: PC 7.7, LG 7, HT 7, ST 36, LT: LI 4, SP 9, 6, 4, KD 3    Patient Goals: Be able to sleep thought the night without hot flashes    Face Time: 38 minutes  Total Time: 60 minutes      Treatment performed by Gema Bone. at [facility]. No follow-ups on file.     Oracio Mcneil, [facility]  11/18/2022  1:17 PM

## 2022-12-02 ENCOUNTER — OFFICE VISIT (OUTPATIENT)
Dept: INTEGRATIVE MEDICINE | Facility: CLINIC | Age: 52
End: 2022-12-02
Payer: COMMERCIAL

## 2022-12-02 DIAGNOSIS — M54.2 NECK PAIN: ICD-10-CM

## 2022-12-02 DIAGNOSIS — R23.2 HOT FLASHES: Primary | ICD-10-CM

## 2022-12-02 PROCEDURE — 97811 ACUP 1/> W/O ESTIM EA ADD 15: CPT | Performed by: ACUPUNCTURIST

## 2022-12-02 PROCEDURE — 97810 ACUP 1/> WO ESTIM 1ST 15 MIN: CPT | Performed by: ACUPUNCTURIST

## 2022-12-09 ENCOUNTER — OFFICE VISIT (OUTPATIENT)
Dept: INTEGRATIVE MEDICINE | Facility: CLINIC | Age: 52
End: 2022-12-09
Payer: COMMERCIAL

## 2022-12-09 ENCOUNTER — TELEPHONE (OUTPATIENT)
Dept: INTEGRATIVE MEDICINE | Facility: CLINIC | Age: 52
End: 2022-12-09

## 2022-12-09 ENCOUNTER — HOSPITAL ENCOUNTER (OUTPATIENT)
Dept: MAMMOGRAPHY | Facility: HOSPITAL | Age: 52
Discharge: HOME | End: 2022-12-09
Attending: FAMILY MEDICINE
Payer: COMMERCIAL

## 2022-12-09 ENCOUNTER — HOSPITAL ENCOUNTER (OUTPATIENT)
Dept: ULTRASOUND IMAGING | Facility: HOSPITAL | Age: 52
Discharge: HOME | End: 2022-12-09
Attending: FAMILY MEDICINE
Payer: COMMERCIAL

## 2022-12-09 DIAGNOSIS — Z12.31 SCREENING MAMMOGRAM, ENCOUNTER FOR: ICD-10-CM

## 2022-12-09 DIAGNOSIS — N91.2 AMENORRHEA: ICD-10-CM

## 2022-12-09 DIAGNOSIS — G47.00 INSOMNIA, UNSPECIFIED TYPE: Primary | ICD-10-CM

## 2022-12-09 PROCEDURE — 76856 US EXAM PELVIC COMPLETE: CPT | Performed by: FAMILY MEDICINE

## 2022-12-09 PROCEDURE — 77063 BREAST TOMOSYNTHESIS BI: CPT | Performed by: FAMILY MEDICINE

## 2022-12-09 PROCEDURE — 76830 TRANSVAGINAL US NON-OB: CPT | Performed by: FAMILY MEDICINE

## 2022-12-09 PROCEDURE — 77067 SCR MAMMO BI INCL CAD: CPT | Performed by: FAMILY MEDICINE

## 2022-12-09 PROCEDURE — 97810 ACUP 1/> WO ESTIM 1ST 15 MIN: CPT | Performed by: ACUPUNCTURIST

## 2022-12-09 PROCEDURE — 97811 ACUP 1/> W/O ESTIM EA ADD 15: CPT | Performed by: ACUPUNCTURIST

## 2022-12-09 NOTE — TELEPHONE ENCOUNTER
Lab reviewed. Confusion as follow up on 12/16 is with TCM provider. Please advise that her hormone panel is consistent with post menopause findings. If wishes to discuss any hormone replacement therapy will need a follow up apt. Her cholesterol, blood sugar, vitamin d, and thyroid are all optimal at this time.

## 2022-12-09 NOTE — TELEPHONE ENCOUNTER
RN called and spoke to patient. RN notified patient that Dr. Tony Kelly has not reviewed labs as of yet, and when he does we will contact her with results.

## 2022-12-12 NOTE — TELEPHONE ENCOUNTER
RN called and spoke to patient. Notified patient of Dr. Nathan Bolden message and directives below. Patient verbalized understanding. Patient scheduled follow up for 3/2023 with Dr. Casimiro Hernandez.

## 2022-12-16 ENCOUNTER — OFFICE VISIT (OUTPATIENT)
Dept: INTEGRATIVE MEDICINE | Facility: CLINIC | Age: 52
End: 2022-12-16
Payer: COMMERCIAL

## 2022-12-16 ENCOUNTER — NURSE ONLY (OUTPATIENT)
Dept: INTEGRATIVE MEDICINE | Facility: CLINIC | Age: 52
End: 2022-12-16

## 2022-12-16 DIAGNOSIS — G47.00 INSOMNIA, UNSPECIFIED TYPE: Primary | ICD-10-CM

## 2022-12-16 PROCEDURE — 97810 ACUP 1/> WO ESTIM 1ST 15 MIN: CPT | Performed by: ACUPUNCTURIST

## 2022-12-16 PROCEDURE — 97811 ACUP 1/> W/O ESTIM EA ADD 15: CPT | Performed by: ACUPUNCTURIST

## 2022-12-16 NOTE — PROGRESS NOTES
Reiki Follow Up Note    Patient Name: Mark Starkey   YOB: 1970   Medical Record Number: [id number]   CSN: [id number]     Date of Visit: 12/16/2022    Reason for scheduling reiki session: No chief complaint on file. Relaxation  Name of referring physician: No ref. provider found  Self-referred  Overall health status: Since first Reiki session, client has taken Reiki Level I class. She has been self-treating with Reiki and asked questions about her practice. Discussed her questions. States that she has been having some sleep difficulties as well as lower abdominal discomfort. Addressing with PCP. Also receiving acupuncture. Has been feeling stress at work. 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 Patient volume has been high with flu and RSV. Observation: Session began with series of head to toe sweeps, deep breaths and guided imagery to promote relaxation. Byosen (scale 1-5) noted at forehead level 3; heart / throat area at level 3-4; lower abdomen / root area at level 4. Focused work to these areas. Client rested quietly. Client Experience: Described relaxation and feeling of peace. Could feel warmth over throat / head. States that energetic connection to Reiki is stronger after taking class. Answered further questions from client about Reiki practice. Encouraged her to continue self-treatment every day. Return for session prn.      Intake Form Data:                      Electronically Signed by:    Alfonso Leiva RN, 12/16/2022     Epic Template #[id number]

## 2022-12-30 ENCOUNTER — OFFICE VISIT (OUTPATIENT)
Dept: INTEGRATIVE MEDICINE | Facility: CLINIC | Age: 52
End: 2022-12-30
Payer: COMMERCIAL

## 2022-12-30 DIAGNOSIS — G47.00 INSOMNIA, UNSPECIFIED TYPE: Primary | ICD-10-CM

## 2022-12-30 PROCEDURE — 97810 ACUP 1/> WO ESTIM 1ST 15 MIN: CPT | Performed by: ACUPUNCTURIST

## 2022-12-30 PROCEDURE — 97811 ACUP 1/> W/O ESTIM EA ADD 15: CPT | Performed by: ACUPUNCTURIST

## 2023-01-13 ENCOUNTER — OFFICE VISIT (OUTPATIENT)
Dept: INTEGRATIVE MEDICINE | Facility: CLINIC | Age: 53
End: 2023-01-13
Payer: COMMERCIAL

## 2023-01-13 ENCOUNTER — NURSE ONLY (OUTPATIENT)
Dept: INTEGRATIVE MEDICINE | Facility: CLINIC | Age: 53
End: 2023-01-13

## 2023-01-13 DIAGNOSIS — G47.00 INSOMNIA, UNSPECIFIED TYPE: Primary | ICD-10-CM

## 2023-01-13 PROCEDURE — 97810 ACUP 1/> WO ESTIM 1ST 15 MIN: CPT | Performed by: ACUPUNCTURIST

## 2023-01-13 PROCEDURE — 97811 ACUP 1/> W/O ESTIM EA ADD 15: CPT | Performed by: ACUPUNCTURIST

## 2023-01-13 PROCEDURE — 99199 UNLISTED SPECIAL SVC PX/RPRT: CPT

## 2023-01-13 NOTE — PROGRESS NOTES
Reiki Follow Up Note    Patient Name: Lucy Duffy   YOB: 1970   Medical Record Number: [id number]   CSN: [id number]     Date of Visit: 1/13/2023    Reason for scheduling reiki session: No chief complaint on file. Relaxation  Name of referring physician: No ref. provider found  Self referred  Overall health status: States that she is dealing with stress at work and some at home. Sleep is better at times and has been using Reiki self treatment to help her sleep. States that she sometimes has bladder using especially with getting up at night to bathroom. Asked questions about her Reiki practice. Gave client information and encouragement. Observation: Session began with series of head to toe sweeps, deep breaths and guided imagery to promote relaxation. Encouraged client to self-treat during session. Byosen (scale 1-5) at crown / forehead level 3; bladder area level 3. Focused work to these areas. Gentle energetic connection through out. Client rested quietly. Client Experience: States that she feels more relaxed, calmer and peaceful. Suggested that client use mantras during her self-treatment practice, e.g. \"peace and healing. \" Encouraged daily practice with Reiki. Keep it simple. Client may take Level I Reiki class again for review. Emotional support offered. Return for session prn.      Intake Form Data:     Pre-Reiki Therapy (Pain): 0  Pre-Reiki Therapy (Anxiety): 5     Post-Reiki Therapy (Pain): 0  Post-Reiki Therapy (Anxiety): 0    Electronically Signed by:    Robbi Wood RN, 1/13/2023     Epic Template #[id number]

## 2023-01-30 ENCOUNTER — OFFICE VISIT (OUTPATIENT)
Dept: INTEGRATIVE MEDICINE | Facility: CLINIC | Age: 53
End: 2023-01-30
Payer: COMMERCIAL

## 2023-01-30 ENCOUNTER — NURSE ONLY (OUTPATIENT)
Dept: INTEGRATIVE MEDICINE | Facility: CLINIC | Age: 53
End: 2023-01-30

## 2023-01-30 DIAGNOSIS — G47.00 INSOMNIA, UNSPECIFIED TYPE: Primary | ICD-10-CM

## 2023-01-30 PROCEDURE — 97811 ACUP 1/> W/O ESTIM EA ADD 15: CPT | Performed by: ACUPUNCTURIST

## 2023-01-30 PROCEDURE — 97810 ACUP 1/> WO ESTIM 1ST 15 MIN: CPT | Performed by: ACUPUNCTURIST

## 2023-01-30 NOTE — PROGRESS NOTES
Reiki Follow Up Note    Patient Name: Shirley Harrison   YOB: 1970   Medical Record Number: [id number]   CSN: [id number]     Date of Visit: 1/30/2023    Reason for scheduling reiki session: No chief complaint on file. Relaxation and release of stress  Name of referring physician: No ref. provider found  Self-referred  Overall health status: Continues to deal with stress at work. States that she sometimes feels \"stuck\" in her job. Continues to deal with an irritable bladder which causes her to get up at night to urinate. States she continues to have difficulty with sleeping at times. \"Sometimes I think I don't want to sleep because then I have to get up the next morning and go to work. \"  However, states that since beginning Reiki and acupuncture sessions, she is sleeping better. Discussed ways to find meaning and purpose in her life outside of work. Observation: Encouraged client to self treat with Reiki during session. Session began with series of head to toe sweeps, deep breaths and guided imagery to promote relaxation. Byosen (scale 1-5) noted at forehead / temples level 3-4; mid-abdomen at level 3-4; bladder area level 3-4. Focused work to these areas. Client rested quietly and appeared to sleep lightly at times. Client Experience: Feelings of stress and discomfort decreased to level 1. Described feelings of peace, calm and relaxation. States that she is able to relax and connect more quickly to Reiki. States that she does self treat on a regular basis. States that she feels she is in a better place now compared to 6 months ago. Emotional support offered. Return for session prn.      Intake Form Data:     Pre-Reiki Therapy (Pain): 4  Pre-Reiki Therapy (Anxiety): 8     Post-Reiki Therapy (Pain): 1  Post-Reiki Therapy (Anxiety): 1    Electronically Signed by:    Sydnee Falcon RN, 1/30/2023     Epic Template #[id number]

## 2023-02-24 ENCOUNTER — OFFICE VISIT (OUTPATIENT)
Dept: INTEGRATIVE MEDICINE | Facility: CLINIC | Age: 53
End: 2023-02-24
Payer: COMMERCIAL

## 2023-02-24 DIAGNOSIS — G47.00 INSOMNIA, UNSPECIFIED TYPE: Primary | ICD-10-CM

## 2023-02-24 DIAGNOSIS — R23.2 HOT FLASHES: ICD-10-CM

## 2023-02-24 PROCEDURE — 97810 ACUP 1/> WO ESTIM 1ST 15 MIN: CPT | Performed by: ACUPUNCTURIST

## 2023-02-24 PROCEDURE — 97811 ACUP 1/> W/O ESTIM EA ADD 15: CPT | Performed by: ACUPUNCTURIST

## 2023-03-08 ENCOUNTER — OFFICE VISIT (OUTPATIENT)
Dept: INTEGRATIVE MEDICINE | Facility: CLINIC | Age: 53
End: 2023-03-08
Payer: COMMERCIAL

## 2023-03-08 DIAGNOSIS — G47.00 INSOMNIA, UNSPECIFIED TYPE: Primary | ICD-10-CM

## 2023-03-08 DIAGNOSIS — R23.2 HOT FLASHES: ICD-10-CM

## 2023-03-08 PROCEDURE — 97810 ACUP 1/> WO ESTIM 1ST 15 MIN: CPT | Performed by: ACUPUNCTURIST

## 2023-03-08 PROCEDURE — 97811 ACUP 1/> W/O ESTIM EA ADD 15: CPT | Performed by: ACUPUNCTURIST

## 2023-03-14 ENCOUNTER — OFFICE VISIT (OUTPATIENT)
Dept: INTEGRATIVE MEDICINE | Facility: CLINIC | Age: 53
End: 2023-03-14
Payer: COMMERCIAL

## 2023-03-14 VITALS
HEART RATE: 66 BPM | WEIGHT: 161 LBS | OXYGEN SATURATION: 99 % | DIASTOLIC BLOOD PRESSURE: 80 MMHG | BODY MASS INDEX: 28 KG/M2 | SYSTOLIC BLOOD PRESSURE: 126 MMHG

## 2023-03-14 DIAGNOSIS — E78.5 HYPERLIPIDEMIA, UNSPECIFIED HYPERLIPIDEMIA TYPE: ICD-10-CM

## 2023-03-14 DIAGNOSIS — R23.2 HOT FLASHES: ICD-10-CM

## 2023-03-14 DIAGNOSIS — R73.01 ELEVATED FASTING BLOOD SUGAR: Primary | ICD-10-CM

## 2023-03-14 DIAGNOSIS — G47.9 SLEEP DISTURBANCE: ICD-10-CM

## 2023-03-14 PROCEDURE — 3074F SYST BP LT 130 MM HG: CPT | Performed by: FAMILY MEDICINE

## 2023-03-14 PROCEDURE — 3079F DIAST BP 80-89 MM HG: CPT | Performed by: FAMILY MEDICINE

## 2023-03-14 PROCEDURE — 99214 OFFICE O/P EST MOD 30 MIN: CPT | Performed by: FAMILY MEDICINE

## 2023-03-14 RX ORDER — ZALEPLON 5 MG/1
CAPSULE ORAL
Qty: 30 CAPSULE | Refills: 0 | Status: SHIPPED | OUTPATIENT
Start: 2023-03-14

## 2023-03-22 ENCOUNTER — OFFICE VISIT (OUTPATIENT)
Dept: INTEGRATIVE MEDICINE | Facility: CLINIC | Age: 53
End: 2023-03-22
Payer: COMMERCIAL

## 2023-03-22 DIAGNOSIS — Z71.3 ENCOUNTER FOR WEIGHT LOSS COUNSELING: Primary | ICD-10-CM

## 2023-03-22 DIAGNOSIS — E66.9 OBESITY (BMI 35.0-39.9 WITHOUT COMORBIDITY): ICD-10-CM

## 2023-03-22 DIAGNOSIS — R23.2 HOT FLASHES: Primary | ICD-10-CM

## 2023-03-22 DIAGNOSIS — M54.2 NECK PAIN: ICD-10-CM

## 2023-03-22 DIAGNOSIS — Z71.3 DIETARY COUNSELING: ICD-10-CM

## 2023-03-22 DIAGNOSIS — G47.00 INSOMNIA, UNSPECIFIED TYPE: ICD-10-CM

## 2023-03-22 PROCEDURE — 97811 ACUP 1/> W/O ESTIM EA ADD 15: CPT | Performed by: ACUPUNCTURIST

## 2023-03-22 PROCEDURE — 97810 ACUP 1/> WO ESTIM 1ST 15 MIN: CPT | Performed by: ACUPUNCTURIST

## 2023-03-22 NOTE — PATIENT INSTRUCTIONS
Your goal is to have 2/3 of your plate full of vegetables, fruits, whole grains, beans, and legumes at each meal. 1/3 or less can be animal protein (lean meat, fatty fish, eggs, low-fat dairy). Utilize new recipes to find creative ways to enjoy your meals!    [email or web address]          Here are some recipes to get you started following the Mediterranean Diet [email or web address]

## 2023-03-27 RX ORDER — LOSARTAN POTASSIUM 50 MG/1
TABLET ORAL
Qty: 90 TABLET | Refills: 0 | Status: SHIPPED | OUTPATIENT
Start: 2023-03-27

## 2023-04-05 ENCOUNTER — OFFICE VISIT (OUTPATIENT)
Dept: INTEGRATIVE MEDICINE | Facility: CLINIC | Age: 53
End: 2023-04-05
Payer: COMMERCIAL

## 2023-04-05 DIAGNOSIS — G47.00 INSOMNIA, UNSPECIFIED TYPE: ICD-10-CM

## 2023-04-05 DIAGNOSIS — R23.2 HOT FLASHES: ICD-10-CM

## 2023-04-05 DIAGNOSIS — M54.2 NECK PAIN: Primary | ICD-10-CM

## 2023-04-05 PROCEDURE — 97810 ACUP 1/> WO ESTIM 1ST 15 MIN: CPT | Performed by: ACUPUNCTURIST

## 2023-04-05 PROCEDURE — 97811 ACUP 1/> W/O ESTIM EA ADD 15: CPT | Performed by: ACUPUNCTURIST

## 2023-06-12 ENCOUNTER — MED REC SCAN ONLY (OUTPATIENT)
Dept: INTEGRATIVE MEDICINE | Facility: CLINIC | Age: 53
End: 2023-06-12

## 2023-06-21 ENCOUNTER — OFFICE VISIT (OUTPATIENT)
Dept: INTEGRATIVE MEDICINE | Facility: CLINIC | Age: 53
End: 2023-06-21
Payer: COMMERCIAL

## 2023-06-21 VITALS — BODY MASS INDEX: 27 KG/M2 | WEIGHT: 159 LBS

## 2023-06-21 DIAGNOSIS — Z71.3 ENCOUNTER FOR WEIGHT LOSS COUNSELING: Primary | ICD-10-CM

## 2023-06-21 DIAGNOSIS — E66.3 OVERWEIGHT (BMI 25.0-29.9): ICD-10-CM

## 2023-06-21 PROCEDURE — 97803 MED NUTRITION INDIV SUBSEQ: CPT

## 2023-06-23 ENCOUNTER — OFFICE VISIT (OUTPATIENT)
Dept: INTEGRATIVE MEDICINE | Facility: CLINIC | Age: 53
End: 2023-06-23
Payer: COMMERCIAL

## 2023-06-23 DIAGNOSIS — G47.00 INSOMNIA, UNSPECIFIED TYPE: Primary | ICD-10-CM

## 2023-06-23 PROCEDURE — 97811 ACUP 1/> W/O ESTIM EA ADD 15: CPT | Performed by: ACUPUNCTURIST

## 2023-06-23 PROCEDURE — 97810 ACUP 1/> WO ESTIM 1ST 15 MIN: CPT | Performed by: ACUPUNCTURIST

## 2023-06-23 NOTE — PATIENT INSTRUCTIONS
You will receive an email from [facility] with your herbal prescription. The formula is called Ash singleton and you can start with 2 pills 2 times per day. There will be further instructions in the email from [facility].

## 2023-07-14 ENCOUNTER — OFFICE VISIT (OUTPATIENT)
Dept: INTEGRATIVE MEDICINE | Facility: CLINIC | Age: 53
End: 2023-07-14
Payer: COMMERCIAL

## 2023-07-14 DIAGNOSIS — G47.00 INSOMNIA, UNSPECIFIED TYPE: Primary | ICD-10-CM

## 2023-07-14 PROCEDURE — 97810 ACUP 1/> WO ESTIM 1ST 15 MIN: CPT | Performed by: ACUPUNCTURIST

## 2023-07-14 PROCEDURE — 97811 ACUP 1/> W/O ESTIM EA ADD 15: CPT | Performed by: ACUPUNCTURIST

## 2023-07-14 NOTE — PROGRESS NOTES
Timur Acevedo is a 47year old female Acupuncture Therapy. Chief Complaint: Insomnia  Secondary Complaint: Hot flashes  Tertiary Complaint: Urinary Frequency and irritaiton      Self reported health status:      Patient reported severity of symptom(s) over the last 24 hours  With zero reporting no symptoms and 10 reporting the worst severity     Symptom 1: 4  Symptom 2: 0  Symptom 3: 2-3     Response to last TX: After lt tx, she has not had any hot flashes and insomnia improved for 1-2 weeks, but has returned now. She continues to have urinary frequency at night, dry mouth and very high stress and anxiety; this improved after the last tx but returned after about 1 week. Has started [employer] yin but is taking 3BID instead of 4 BID and has noticed a difference. Initial Consult 11/18/22: Siva Donahue suffers from hot flashes since beginning menopause over 1 year ago. The hot flashes are frequent and it keeps her up at night which has lead to the insomnia. Additionally, she has polyuria at night which has been going on for more than a year,  Her Bowel movements are loose and this has been worse since beginning magnesium as prescribed by  [address] [place] [address]. We discussed reducing the dose to see if this helps BM. Her stress is high and she tends to be anxious. Has a hx of a keloid. Is a pediatric nurse. Objective (Pulse, Tongue, Vitals): Pulse is thin and wiry. Tongue is unremarkable but there was SLV distention. TCM Diagnosis: LR/FIONA treviño     Plan of Care: Acupuncture Therapy  First set: Bilateral ulloa men, an danelle  Second set: RT: PC 7.2, LG 7, HT 7, ST 40, GB 41 LT: LI 4,SJ 5,  SP 9, 6, 4, KD 3  Chinese herbal medicine: Garth ma gou rajwinder yin 3 BID     Patient Goals: Be able to sleep thought the night without hot flashes     Face Time: 28 minutes  Total Time: 50 minutes     Treatment performed by Mirza Benitez. at [place] [address]. No follow-ups on file.     Farhana Herrera L.AC  7/14/2023  10:46 AM

## 2023-07-26 ENCOUNTER — OFFICE VISIT (OUTPATIENT)
Dept: INTEGRATIVE MEDICINE | Facility: CLINIC | Age: 53
End: 2023-07-26
Payer: COMMERCIAL

## 2023-07-26 DIAGNOSIS — R35.0 INCREASED URINARY FREQUENCY: ICD-10-CM

## 2023-07-26 DIAGNOSIS — G47.00 INSOMNIA, UNSPECIFIED TYPE: Primary | ICD-10-CM

## 2023-07-26 DIAGNOSIS — R68.2 DRY MOUTH: ICD-10-CM

## 2023-07-26 PROCEDURE — 97810 ACUP 1/> WO ESTIM 1ST 15 MIN: CPT | Performed by: ACUPUNCTURIST

## 2023-07-26 PROCEDURE — 97811 ACUP 1/> W/O ESTIM EA ADD 15: CPT | Performed by: ACUPUNCTURIST

## 2023-07-27 NOTE — PROGRESS NOTES
Fariha Vieira is a 47year old female No chief complaint on file. .     Chief Complaint:    1. Insomnia  2. Dry mouth  3. Urinary Frequency and irritaiton      Self reported health status:      Patient reported severity of symptom(s) over the last 24 hours  With zero reporting no symptoms and 10 reporting the worst severity     Symptom 1: 7-8  Symptom 2: 2-3  Symptom 3: 5     Response to last TX:  Aline Freeman reports the last 4 sessions we had together (before going   [place] on vacation) she had no symptoms at all. Her hty flashes went away, her insomnia had lifted and her dry mouth was getting better. Currently, she reports she did not get relief from last treatment her insomnia is worse, but has not had hot flashes in a long time. Hot flashes have been resolved and no longer one of her complains. She also reports the current herbal formula (Garth singleton)  she is taking is making her drowsy and which effects in the day. I told her I would speak with Dr. Courtney Lewis of using a different formula. HPI: 7/14/2023  After lt tx, she has not had any hot flashes and insomnia improved for 1-2 weeks, but has returned now. She continues to have urinary frequency at night, dry mouth and very high stress and anxiety; this improved after the last tx but returned after about 1 week. Has started [employer] yin but is taking 3BID instead of 4 BID and has noticed a difference. Objective (Pulse, Tongue, Vitals): Pulse is thin and wiry. Tongue is unremarkable but there was SLV distention. TCM Diagnosis: areli and molly treviño     Plan of Care: Acupuncture Therapy    Set 1: Auricular: artemio go (B), An Ardith Cornejo    Set 2: KID-3, SP-6, KID-5    [facility] herbal medicine: I did not change her formula until I discuss it with Dr. Courtney Lewis.      Patient Goals: Be able to sleep thought the night without hot flashes     Face Time: 28 minutes  Total Time: 50 minutes         Treatment performed by Evalene Goltz. at [facility]. No follow-ups on file.     Mark Vang L.AC  7/27/2023  8:30 AM

## 2023-08-02 ENCOUNTER — OFFICE VISIT (OUTPATIENT)
Dept: INTEGRATIVE MEDICINE | Facility: CLINIC | Age: 53
End: 2023-08-02
Payer: COMMERCIAL

## 2023-08-02 DIAGNOSIS — G47.00 INSOMNIA, UNSPECIFIED TYPE: Primary | ICD-10-CM

## 2023-08-02 DIAGNOSIS — R68.2 DRY MOUTH: ICD-10-CM

## 2023-08-02 DIAGNOSIS — R35.0 INCREASED URINARY FREQUENCY: ICD-10-CM

## 2023-08-02 PROCEDURE — 97810 ACUP 1/> WO ESTIM 1ST 15 MIN: CPT | Performed by: ACUPUNCTURIST

## 2023-08-02 PROCEDURE — 97811 ACUP 1/> W/O ESTIM EA ADD 15: CPT | Performed by: ACUPUNCTURIST

## 2023-08-02 NOTE — PROGRESS NOTES
Tim Gonzalez is a 47year old female No chief complaint on file. .     Chief Complaint:    1. Insomnia  2. Dry mouth  3. Urinary Frequency and irritaiton      Self reported health status:      Patient reported severity of symptom(s) over the last 24 hours  With zero reporting no symptoms and 10 reporting the worst severity     Symptom 1: 6-7  Symptom 2: 2-3  Symptom 3: 5     Response to last TX:  Shannen Tabares reports the day of the acupuncture she got some relief but her levels of discomfort are about the same. HPI: 7/26/2023    Shannen Tabares reports the last 4 sessions we had together (before going   [place] on vacation) she had no symptoms at all. Her hty flashes went away, her insomnia had lifted and her dry mouth was getting better. Currently, she reports she did not get relief from last treatment her insomnia is worse, but has not had hot flashes in a long time. Hot flashes have been resolved and no longer one of her complains. She also reports the current herbal formula (Garth erica purdy yin)  she is taking is making her drowsy and which effects in the day. I told her I would speak with Dr. Georgia Huang of using a different formula. TCM Diagnosis: yin and molly treviño     Plan of Care: Acupuncture Therapy     Set 1: Auricular: ulloa abbi (B),Yin Hoffman     Set 2: KID-3, KID-6, SP-6, TOÑO-3, LI-2     Note: Chinese herbal medicine: will be changing herbal formula. Patient Goals: Be able to sleep thought the night without hot flashes     Face Time: 28 minutes  Total Time: 50 minutes     Treatment performed by Meseret Corbett. at [facility]. No follow-ups on file.     Rena Yuen L.AC  8/2/2023  12:22 PM

## 2023-08-09 ENCOUNTER — OFFICE VISIT (OUTPATIENT)
Dept: FAMILY MEDICINE CLINIC | Facility: CLINIC | Age: 53
End: 2023-08-09

## 2023-08-09 ENCOUNTER — PATIENT MESSAGE (OUTPATIENT)
Dept: FAMILY MEDICINE CLINIC | Facility: CLINIC | Age: 53
End: 2023-08-09

## 2023-08-09 ENCOUNTER — LAB ENCOUNTER (OUTPATIENT)
Dept: LAB | Age: 53
End: 2023-08-09
Attending: FAMILY MEDICINE
Payer: COMMERCIAL

## 2023-08-09 VITALS
HEIGHT: 64 IN | WEIGHT: 159 LBS | BODY MASS INDEX: 27.14 KG/M2 | DIASTOLIC BLOOD PRESSURE: 83 MMHG | SYSTOLIC BLOOD PRESSURE: 124 MMHG | HEART RATE: 87 BPM

## 2023-08-09 DIAGNOSIS — R35.1 NOCTURIA MORE THAN TWICE PER NIGHT: ICD-10-CM

## 2023-08-09 DIAGNOSIS — Z13.21 ENCOUNTER FOR VITAMIN DEFICIENCY SCREENING: ICD-10-CM

## 2023-08-09 DIAGNOSIS — I10 ESSENTIAL HYPERTENSION: ICD-10-CM

## 2023-08-09 DIAGNOSIS — Z13.220 LIPID SCREENING: ICD-10-CM

## 2023-08-09 DIAGNOSIS — E55.9 VITAMIN D DEFICIENCY: ICD-10-CM

## 2023-08-09 DIAGNOSIS — J45.40 MODERATE PERSISTENT ASTHMA WITHOUT COMPLICATION: Primary | ICD-10-CM

## 2023-08-09 DIAGNOSIS — J45.40 MODERATE PERSISTENT ASTHMA WITHOUT COMPLICATION: ICD-10-CM

## 2023-08-09 DIAGNOSIS — Z23 NEED FOR SHINGLES VACCINE: ICD-10-CM

## 2023-08-09 DIAGNOSIS — Z13.1 DIABETES MELLITUS SCREENING: ICD-10-CM

## 2023-08-09 DIAGNOSIS — G47.09 OTHER INSOMNIA: ICD-10-CM

## 2023-08-09 PROBLEM — G47.30 SLEEP APNEA: Status: ACTIVE | Noted: 2023-08-09

## 2023-08-09 PROBLEM — J45.909 ASTHMA (HCC): Status: ACTIVE | Noted: 2023-08-09

## 2023-08-09 PROBLEM — J45.909 ASTHMA: Status: ACTIVE | Noted: 2023-08-09

## 2023-08-09 LAB
BILIRUB UR QL: NEGATIVE
GLUCOSE UR-MCNC: NORMAL MG/DL
HGB UR QL STRIP.AUTO: NEGATIVE
KETONES UR-MCNC: NEGATIVE MG/DL
LEUKOCYTE ESTERASE UR QL STRIP.AUTO: NEGATIVE
NITRITE UR QL STRIP.AUTO: NEGATIVE
PH UR: 7 [PH] (ref 5–8)
PROT UR-MCNC: NEGATIVE MG/DL
SP GR UR STRIP: 1.02 (ref 1–1.03)
UROBILINOGEN UR STRIP-ACNC: NORMAL

## 2023-08-09 PROCEDURE — 99214 OFFICE O/P EST MOD 30 MIN: CPT | Performed by: FAMILY MEDICINE

## 2023-08-09 PROCEDURE — 81001 URINALYSIS AUTO W/SCOPE: CPT | Performed by: FAMILY MEDICINE

## 2023-08-09 PROCEDURE — 90471 IMMUNIZATION ADMIN: CPT | Performed by: FAMILY MEDICINE

## 2023-08-09 PROCEDURE — 3074F SYST BP LT 130 MM HG: CPT | Performed by: FAMILY MEDICINE

## 2023-08-09 PROCEDURE — 90750 HZV VACC RECOMBINANT IM: CPT | Performed by: FAMILY MEDICINE

## 2023-08-09 PROCEDURE — 3079F DIAST BP 80-89 MM HG: CPT | Performed by: FAMILY MEDICINE

## 2023-08-09 PROCEDURE — 3008F BODY MASS INDEX DOCD: CPT | Performed by: FAMILY MEDICINE

## 2023-08-09 RX ORDER — ZALEPLON 5 MG/1
CAPSULE ORAL
Qty: 60 CAPSULE | Refills: 0 | Status: SHIPPED | OUTPATIENT
Start: 2023-08-09 | End: 2023-08-11

## 2023-08-09 RX ORDER — ALBUTEROL SULFATE 90 UG/1
2 AEROSOL, METERED RESPIRATORY (INHALATION) EVERY 4 HOURS PRN
Qty: 18 G | Refills: 1 | Status: SHIPPED | OUTPATIENT
Start: 2023-08-09 | End: 2023-08-10

## 2023-08-09 RX ORDER — LOSARTAN POTASSIUM 50 MG/1
50 TABLET ORAL EVERY MORNING
Qty: 90 TABLET | Refills: 3 | Status: SHIPPED | OUTPATIENT
Start: 2023-08-09 | End: 2024-08-03

## 2023-08-09 NOTE — ASSESSMENT & PLAN NOTE
Unclear if out of habit because has trouble sleeping vs. Actual urge. Pt does have some stress incontinence, but no other noted urgency or OAB during the same. Pt to keep a bladder diary and to f/u to review.

## 2023-08-09 NOTE — ASSESSMENT & PLAN NOTE
Last appt 8/30/18  Last refilled info;   Disp Refills Start End    gabapentin (NEURONTIN) 300 MG capsule        Sig - Route: Take 300 mg by mouth at bedtime. - Oral    Class: Historical Med        Next appt 6/4/19  Refill unable to be completed per standing protocol due to; historical medication, confirmed with patient that Kennedi Buchanan has filled for her in the past?  Orders pended, and routed to provider for approval.      Suspect underlying anxiety as component of insomnia.   Will evaluate further at next appt

## 2023-08-10 DIAGNOSIS — G47.09 OTHER INSOMNIA: ICD-10-CM

## 2023-08-11 RX ORDER — ALBUTEROL SULFATE 90 UG/1
2 AEROSOL, METERED RESPIRATORY (INHALATION) EVERY 4 HOURS PRN
Qty: 25.5 G | Refills: 1 | Status: SHIPPED | OUTPATIENT
Start: 2023-08-11

## 2023-08-11 RX ORDER — ZALEPLON 5 MG/1
CAPSULE ORAL
Qty: 30 CAPSULE | Refills: 0 | OUTPATIENT
Start: 2023-08-11

## 2023-08-11 RX ORDER — ZALEPLON 5 MG/1
CAPSULE ORAL
Qty: 60 CAPSULE | Refills: 0 | Status: CANCELLED | OUTPATIENT
Start: 2023-08-11

## 2023-08-11 RX ORDER — ZALEPLON 5 MG/1
CAPSULE ORAL
Qty: 60 CAPSULE | Refills: 0 | Status: SHIPPED | OUTPATIENT
Start: 2023-08-11

## 2023-08-11 NOTE — TELEPHONE ENCOUNTER
Dr. oDna Dias, please see patient's [employer]hart message below and advise on supply of 3 inhalers, pended for review. Thank you. Previous Prescription:  Medication Quantity Refills Start End   albuterol (PROAIR HFA) 108 (90 Base) MCG/ACT Inhalation Aero Soln 18 g 1 8/9/2023    Sig:   Inhale 2 puffs into the lungs every 4 (four) hours as needed for Wheezing. Route:   [address] [place] [address] [phone], [phone]       Requested Prescriptions     Pending Prescriptions Disp Refills    albuterol (PROAIR HFA) 108 (90 Base) MCG/ACT Inhalation Aero Soln 25.5 g 1     Sig: Inhale 2 puffs into the lungs every 4 (four) hours as needed for Wheezing.

## 2023-08-11 NOTE — TELEPHONE ENCOUNTER
Please review. Protocol failed / Has no protocol.      Requested Prescriptions   Pending Prescriptions Disp Refills    Zaleplon 5 MG Oral Cap 60 capsule 0     Sig: Take 1-2 capsule nightly before bed as needed for sleep       There is no refill protocol information for this order        Future Appointments         Provider Department Appt Notes    In 5 days Reinaldo Thompson, [address], [address] Billing Status: $08 Co-pay / Insurance  Pt confirmed with insurance: [employer]   # of visits allowed: (15)  Waiver Expires: (2/18/2023)    In 1 week Reinaldoibll Thompson, [address] Billing Status: $89 Co-pay / Insurance  Pt confirmed with insurance: [employer]   # of visits allowed: (15)  Waiver Expires: (2/18/2023)    In 2 weeks Buddy Albert, DO [address] f/u and immunization    In 2 weeks Reinaldobill Thompson, [address] Billing Status: $04 Co-pay / Insurance  Pt confirmed with insurance: [employer]   # of visits allowed: (15)  Waiver Expires: (2/18/2023)    In 1 month Wendy Esposito, [address], [address] / 60 M[address]    In 1 month Elizabeth Joseph, [address], [address] Billing Status: $69 Co-pay / Insurance  Pt confirmed with insurance: Kiran Black   # of visits allowed: (15)  Waiver Expires: (10/14/2023)           Recent Outpatient Visits              2 days ago Moderate persistent asthma without complication    [facility], [address] [facility] Buddy [place], [place]    Office Visit    1 week ago Insomnia, unspecified type    [address], [address] [place] [address] [address], [address] [place] [address] [address]  Visit    2 weeks ago Insomnia, unspecified type    [address], [facility] Rosanna Wan [address] [facility] [address]    Office Visit    4 weeks ago Insomnia, unspecified type    [address], [address] , [address], [address] [facility] [address]    Office Visit    1 month ago Insomnia, unspecified type    [address], [address] , [address] [facility], [facility]    Office Visit

## 2023-08-15 RX ORDER — ZALEPLON 10 MG/1
10 CAPSULE ORAL NIGHTLY
Qty: 30 CAPSULE | Refills: 0 | Status: SHIPPED | OUTPATIENT
Start: 2023-08-15 | End: 2023-08-16 | Stop reason: DRUGHIGH

## 2023-08-16 ENCOUNTER — OFFICE VISIT (OUTPATIENT)
Dept: INTEGRATIVE MEDICINE | Facility: CLINIC | Age: 53
End: 2023-08-16
Payer: COMMERCIAL

## 2023-08-16 DIAGNOSIS — R35.0 INCREASED URINARY FREQUENCY: ICD-10-CM

## 2023-08-16 DIAGNOSIS — R23.2 HOT FLASHES: Primary | ICD-10-CM

## 2023-08-16 DIAGNOSIS — R68.2 DRY MOUTH: ICD-10-CM

## 2023-08-16 PROCEDURE — 97811 ACUP 1/> W/O ESTIM EA ADD 15: CPT | Performed by: ACUPUNCTURIST

## 2023-08-16 PROCEDURE — 97810 ACUP 1/> WO ESTIM 1ST 15 MIN: CPT | Performed by: ACUPUNCTURIST

## 2023-08-16 RX ORDER — ZALEPLON 5 MG/1
5 CAPSULE ORAL NIGHTLY
Qty: 30 CAPSULE | Refills: 0 | OUTPATIENT
Start: 2023-08-16

## 2023-08-16 RX ORDER — ZALEPLON 5 MG/1
5 CAPSULE ORAL NIGHTLY
Qty: 30 CAPSULE | Refills: 0 | Status: SHIPPED | OUTPATIENT
Start: 2023-08-16 | End: 2023-09-15

## 2023-08-16 NOTE — PROGRESS NOTES
Change to Dexa axial and order LS xray dx: osteoporosis and lumbar pain Holly Omer is a 47year old female No chief complaint on file. .     Chief Complaint:    1. Insomnia  2. Dry mouth  3. Urinary Frequency and irritaiton      Self reported health status:      Patient reported severity of symptom(s) over the last 24 hours  With zero reporting no symptoms and 10 reporting the worst severity     Symptom 1: 4-5  Symptom 2: 6-7  Symptom 3: 2-3     Response to last TX:  Hansel Pyle reports last session was very good she felt a significant difference. Her insomnia is much better. Has been able sleep and rates her insomnia from 6-7 out 10 to 4-5 out 10. She also reports her urinary frequency and irritation also was decreased from a 5 out of 10 to 2-3 out of 10. HPI: 8/02/2023 Hansel Pyle reports the day of the acupuncture she got some relief but her levels of discomfort are about the same. Objective (Pulse, Tongue, Vitals):     Pulse: Slippery (more substance)    Tongue: Red and dry     TCM Diagnosis: yin and molly treviño     Plan of Care: Acupuncture Therapy     Set 1: Auricular: artemio go (B),Yin Hoffman     Set 2: KID-3, KID-6, SP-6, TOÑO-3, LI-2     Note: Chinese herbal medicine: will be changing herbal formula. Patient Goals: Be able to sleep thought the night without hot flashes     Face Time: 28 minutes  Total Time: 50 minutes     [facility] No follow-ups on file.     Jody Showers, [facility]  8/16/2023  2:49 PM

## 2023-08-16 NOTE — TELEPHONE ENCOUNTER
Patient called, verified Name and . She is calling back regarding the zaleplon medication. RN suggested yesterday to cut the pill in half, since her usual dose is just 5 mg. Prescription sent yesterday was in capsule form and there is no way for her to have the medication scored. She did not  the prescription from the pharmacy yesterday. She is requesting for the 5-mg dose instead, for 30 capsules for 30 days as covered by insurance.     Dr. Deidre Cox please advise, see pended Rx for review and approval.

## 2023-08-23 ENCOUNTER — LAB ENCOUNTER (OUTPATIENT)
Dept: LAB | Facility: REFERENCE LAB | Age: 53
End: 2023-08-23
Attending: FAMILY MEDICINE
Payer: COMMERCIAL

## 2023-08-23 ENCOUNTER — OFFICE VISIT (OUTPATIENT)
Dept: INTEGRATIVE MEDICINE | Facility: CLINIC | Age: 53
End: 2023-08-23
Payer: COMMERCIAL

## 2023-08-23 DIAGNOSIS — G47.00 INSOMNIA, UNSPECIFIED TYPE: Primary | ICD-10-CM

## 2023-08-23 DIAGNOSIS — I10 ESSENTIAL HYPERTENSION: ICD-10-CM

## 2023-08-23 DIAGNOSIS — E55.9 VITAMIN D DEFICIENCY: ICD-10-CM

## 2023-08-23 DIAGNOSIS — Z13.21 ENCOUNTER FOR VITAMIN DEFICIENCY SCREENING: ICD-10-CM

## 2023-08-23 DIAGNOSIS — R35.0 INCREASED URINARY FREQUENCY: ICD-10-CM

## 2023-08-23 DIAGNOSIS — G47.09 OTHER INSOMNIA: ICD-10-CM

## 2023-08-23 DIAGNOSIS — R68.2 DRY MOUTH: ICD-10-CM

## 2023-08-23 LAB
ALBUMIN SERPL-MCNC: 3.8 G/DL (ref 3.4–5)
ALBUMIN/GLOB SERPL: 1.2 {RATIO} (ref 1–2)
ALP LIVER SERPL-CCNC: 94 U/L
ALT SERPL-CCNC: 25 U/L
ANION GAP SERPL CALC-SCNC: 7 MMOL/L (ref 0–18)
AST SERPL-CCNC: 15 U/L (ref 15–37)
BILIRUB SERPL-MCNC: 0.5 MG/DL (ref 0.1–2)
BUN BLD-MCNC: 16 MG/DL (ref 7–18)
BUN/CREAT SERPL: 22.5 (ref 10–20)
CALCIUM BLD-MCNC: 8.8 MG/DL (ref 8.5–10.1)
CHLORIDE SERPL-SCNC: 107 MMOL/L (ref 98–112)
CHOLEST SERPL-MCNC: 173 MG/DL (ref ?–200)
CO2 SERPL-SCNC: 27 MMOL/L (ref 21–32)
CREAT BLD-MCNC: 0.71 MG/DL
EGFRCR SERPLBLD CKD-EPI 2021: 102 ML/MIN/1.73M2 (ref 60–?)
EST. AVERAGE GLUCOSE BLD GHB EST-MCNC: 111 MG/DL (ref 68–126)
FASTING PATIENT LIPID ANSWER: YES
FASTING STATUS PATIENT QL REPORTED: YES
GLOBULIN PLAS-MCNC: 3.1 G/DL (ref 2.8–4.4)
GLUCOSE BLD-MCNC: 109 MG/DL (ref 70–99)
HBA1C MFR BLD: 5.5 % (ref ?–5.7)
HDLC SERPL-MCNC: 45 MG/DL (ref 40–59)
LDLC SERPL CALC-MCNC: 100 MG/DL (ref ?–100)
NONHDLC SERPL-MCNC: 128 MG/DL (ref ?–130)
OSMOLALITY SERPL CALC.SUM OF ELEC: 294 MOSM/KG (ref 275–295)
POTASSIUM SERPL-SCNC: 3.6 MMOL/L (ref 3.5–5.1)
PROT SERPL-MCNC: 6.9 G/DL (ref 6.4–8.2)
SODIUM SERPL-SCNC: 141 MMOL/L (ref 136–145)
TRIGL SERPL-MCNC: 160 MG/DL (ref 30–149)
TSI SER-ACNC: 0.44 MIU/ML (ref 0.36–3.74)
VIT D+METAB SERPL-MCNC: 47.1 NG/ML (ref 30–100)
VLDLC SERPL CALC-MCNC: 27 MG/DL (ref 0–30)

## 2023-08-23 PROCEDURE — 36415 COLL VENOUS BLD VENIPUNCTURE: CPT

## 2023-08-23 PROCEDURE — 80061 LIPID PANEL: CPT

## 2023-08-23 PROCEDURE — 80053 COMPREHEN METABOLIC PANEL: CPT

## 2023-08-23 PROCEDURE — 97810 ACUP 1/> WO ESTIM 1ST 15 MIN: CPT | Performed by: ACUPUNCTURIST

## 2023-08-23 PROCEDURE — 97811 ACUP 1/> W/O ESTIM EA ADD 15: CPT | Performed by: ACUPUNCTURIST

## 2023-08-23 PROCEDURE — 84443 ASSAY THYROID STIM HORMONE: CPT

## 2023-08-23 PROCEDURE — 82306 VITAMIN D 25 HYDROXY: CPT

## 2023-08-23 PROCEDURE — 83036 HEMOGLOBIN GLYCOSYLATED A1C: CPT

## 2023-08-23 NOTE — PROGRESS NOTES
Braden Gill is a 47year old female No chief complaint on file. .     Chief Complaint:     1. Insomnia  2. Dry mouth  3. Urinary Frequency and irritaiton      Self reported health status:      Patient reported severity of symptom(s) over the last 24 hours  With zero reporting no symptoms and 10 reporting the worst severity     Symptom 1: 4  Symptom 2: 6-7  Symptom 3: 2     Response to last TX:  Hemanth Oliva reports feeling better over all. she slept better, but still having some difficulty with sleep. Hemanth Oliva felt a significant difference for the better, urinary frequency has is much less. HPI: 8/16/2023  Hemanth Oliva reports last session was very good she felt a significant difference. Her insomnia is much better. Has been able sleep and rates her insomnia from 6-7 out 10 to 4-5 out 10. She also reports her urinary frequency and irritation also was decreased from a 5 out of 10 to 2-3 out of 10. Objective (Pulse, Tongue, Vitals):      Pulse: Slippery (more substance)     Tongue: Red and dry     TCM Diagnosis: areli and molly treviño     Plan of Care: Acupuncture Therapy     Set 1: UB-13,UB-14,UB-15,UB-18,UB-20,UB-23      Set 2: An main, Randolph men     Note: After her treatment she commented This treatment was the best to date. Note: Chinese herbal medicine: will be changing herbal formula. Patient Goals: Be able to sleep thought the night without hot flashes     Face Time: 24 minutes  Total Time: 55 minutes     Treatment performed by Radha Goodwin. at [facility]. No follow-ups on file.     Mac Chan [facility]  8/23/2023  3:51 PM

## 2023-08-30 ENCOUNTER — OFFICE VISIT (OUTPATIENT)
Dept: FAMILY MEDICINE CLINIC | Facility: CLINIC | Age: 53
End: 2023-08-30

## 2023-08-30 VITALS
DIASTOLIC BLOOD PRESSURE: 89 MMHG | WEIGHT: 154.81 LBS | HEART RATE: 77 BPM | HEIGHT: 64 IN | BODY MASS INDEX: 26.43 KG/M2 | SYSTOLIC BLOOD PRESSURE: 134 MMHG

## 2023-08-30 DIAGNOSIS — J45.40 MODERATE PERSISTENT ASTHMA WITHOUT COMPLICATION: ICD-10-CM

## 2023-08-30 DIAGNOSIS — R35.1 NOCTURIA: Primary | ICD-10-CM

## 2023-08-30 DIAGNOSIS — F41.9 ANXIETY: ICD-10-CM

## 2023-08-30 DIAGNOSIS — Z23 NEED FOR STREPTOCOCCUS PNEUMONIAE VACCINATION: ICD-10-CM

## 2023-08-30 PROBLEM — E78.00 HYPERCHOLESTEREMIA: Status: ACTIVE | Noted: 2023-08-30

## 2023-08-30 PROBLEM — J30.2 SEASONAL ALLERGIES: Status: ACTIVE | Noted: 2023-08-30

## 2023-08-30 PROCEDURE — 99214 OFFICE O/P EST MOD 30 MIN: CPT | Performed by: FAMILY MEDICINE

## 2023-08-30 PROCEDURE — 90471 IMMUNIZATION ADMIN: CPT | Performed by: FAMILY MEDICINE

## 2023-08-30 PROCEDURE — 3079F DIAST BP 80-89 MM HG: CPT | Performed by: FAMILY MEDICINE

## 2023-08-30 PROCEDURE — 3075F SYST BP GE 130 - 139MM HG: CPT | Performed by: FAMILY MEDICINE

## 2023-08-30 PROCEDURE — 90677 PCV20 VACCINE IM: CPT | Performed by: FAMILY MEDICINE

## 2023-08-30 PROCEDURE — 3008F BODY MASS INDEX DOCD: CPT | Performed by: FAMILY MEDICINE

## 2023-08-30 RX ORDER — LEVALBUTEROL INHALATION SOLUTION 1.25 MG/3ML
SOLUTION RESPIRATORY (INHALATION)
COMMUNITY

## 2023-08-30 RX ORDER — ESCITALOPRAM OXALATE 10 MG/1
10 TABLET ORAL DAILY
Qty: 90 TABLET | Refills: 0 | Status: SHIPPED | OUTPATIENT
Start: 2023-08-30 | End: 2023-11-28

## 2023-08-30 RX ORDER — FLUTICASONE PROPIONATE AND SALMETEROL 250; 50 UG/1; UG/1
1 POWDER RESPIRATORY (INHALATION) EVERY 12 HOURS SCHEDULED
Qty: 3 EACH | Refills: 0 | Status: SHIPPED | OUTPATIENT
Start: 2023-08-30

## 2023-09-01 PROBLEM — F41.9 ANXIETY: Status: ACTIVE | Noted: 2023-09-01

## 2023-09-01 PROBLEM — R35.1 NOCTURIA: Status: ACTIVE | Noted: 2023-08-09

## 2023-09-06 ENCOUNTER — OFFICE VISIT (OUTPATIENT)
Dept: INTEGRATIVE MEDICINE | Facility: CLINIC | Age: 53
End: 2023-09-06
Payer: COMMERCIAL

## 2023-09-06 DIAGNOSIS — R23.2 HOT FLASHES: ICD-10-CM

## 2023-09-06 DIAGNOSIS — M54.2 NECK PAIN: Primary | ICD-10-CM

## 2023-09-06 DIAGNOSIS — R35.0 INCREASED URINARY FREQUENCY: ICD-10-CM

## 2023-09-06 DIAGNOSIS — R68.2 DRY MOUTH: ICD-10-CM

## 2023-09-06 DIAGNOSIS — G47.00 INSOMNIA, UNSPECIFIED TYPE: ICD-10-CM

## 2023-09-06 PROCEDURE — 97810 ACUP 1/> WO ESTIM 1ST 15 MIN: CPT | Performed by: ACUPUNCTURIST

## 2023-09-06 PROCEDURE — 97811 ACUP 1/> W/O ESTIM EA ADD 15: CPT | Performed by: ACUPUNCTURIST

## 2023-09-06 NOTE — PROGRESS NOTES
Jayden Bhatti is a 47year old female No chief complaint on file. .     Chief Complaint:    1. Insomnia  2. Dry mouth  3. Urinary Frequency and irritaiton      Self reported health status:      Patient reported severity of symptom(s) over the last 24 hours  With zero reporting no symptoms and 10 reporting the worst severity     Symptom 1: 4  Symptom 2: 6-7  Symptom 3: 2     Response to last TX:  Bertha Sweet reports feeling better over all. Bertha Sweet states she really like the back treatment she got last week. she slept better, but still having some difficulty with sleep. Bertha Sweet felt a significant difference for the better, urinary frequency has is much less. Reports to having good energy and good mood. HPI: 8/16/2023  Bertha Sweet reports last session was very good she felt a significant difference. Her insomnia is much better. Has been able sleep and rates her insomnia from 6-7 out 10 to 4-5 out 10. She also reports her urinary frequency and irritation also was decreased from a 5 out of 10 to 2-3 out of 10. Objective (Pulse, Tongue, Vitals):      Pulse: Slippery (more substance)     Tongue: Red and dry     TCM Diagnosis: yin and molly treviño     Plan of Care: Acupuncture Therapy     Set 1: TOÑO-3, KID-3, SP-6, ST-36     Set 2: Randolph men     Note: Started new formula: Ariane Ortega wan modified with Zeke Deep randolph             2 tablets 3 times a day. Patient Goals: Be able to sleep thought the night without hot flashes     Face Time: 24 minutes  Total Time: 55 minutes        Treatment performed by Medina Greco. at [facility]. No follow-ups on file.     Arsh Barros L.AC  9/6/2023  12:06 PM

## 2023-09-11 ENCOUNTER — APPOINTMENT (OUTPATIENT)
Dept: LAB | Facility: HOSPITAL | Age: 53
End: 2023-09-11
Attending: OBSTETRICS & GYNECOLOGY
Payer: COMMERCIAL

## 2023-09-11 ENCOUNTER — OFFICE VISIT (OUTPATIENT)
Dept: SURGERY | Facility: CLINIC | Age: 53
End: 2023-09-11
Payer: COMMERCIAL

## 2023-09-11 ENCOUNTER — NURSE ONLY (OUTPATIENT)
Dept: INTEGRATIVE MEDICINE | Facility: CLINIC | Age: 53
End: 2023-09-11

## 2023-09-11 VITALS
BODY MASS INDEX: 26.18 KG/M2 | HEIGHT: 64 IN | WEIGHT: 153.38 LBS | DIASTOLIC BLOOD PRESSURE: 80 MMHG | SYSTOLIC BLOOD PRESSURE: 122 MMHG

## 2023-09-11 DIAGNOSIS — R68.82 DECREASED LIBIDO: ICD-10-CM

## 2023-09-11 DIAGNOSIS — Z12.4 SCREENING FOR CERVICAL CANCER: Primary | ICD-10-CM

## 2023-09-11 DIAGNOSIS — Z12.31 BREAST CANCER SCREENING BY MAMMOGRAM: ICD-10-CM

## 2023-09-11 DIAGNOSIS — Z01.419 WOMEN'S ANNUAL ROUTINE GYNECOLOGICAL EXAMINATION: ICD-10-CM

## 2023-09-11 DIAGNOSIS — N95.1 INSOMNIA ASSOCIATED WITH MENOPAUSE: ICD-10-CM

## 2023-09-11 PROCEDURE — 87624 HPV HI-RISK TYP POOLED RSLT: CPT

## 2023-09-11 RX ORDER — CHOLECALCIFEROL (VITAMIN D3) 125 MCG
TABLET ORAL
COMMUNITY

## 2023-09-12 ENCOUNTER — PATIENT MESSAGE (OUTPATIENT)
Dept: FAMILY MEDICINE CLINIC | Facility: CLINIC | Age: 53
End: 2023-09-12

## 2023-09-12 ENCOUNTER — TELEPHONE (OUTPATIENT)
Dept: FAMILY MEDICINE | Age: 53
End: 2023-09-12

## 2023-09-12 NOTE — PROGRESS NOTES
Reiki Follow Up Note    Patient Name: Barbi Renner   YOB: 1970   Medical Record Number: [id number]   CSN: [id number]     Date of Visit: 9/11/2023    Reason for scheduling reiki session: No chief complaint on file. Insomnia   Name of referring physician: No ref. provider found  Self referred  Overall health status: Client has come for Reiki sessions in the past. Has difficulty scheduling ongoing sessions due to work schedule. Continues to deal with insomnia. Stated that acupuncture and Reiki sessions were very helpful in the past.  States that she feels menopause may be contributing to insomnia. Client had appointment with Dr. Lux Lagos today. Feels out of balance, as though there is a \"cloud over my head\". Intention for session is balancing. Observation: Session began with series of head to toe sweeps, deep breaths and guided imagery to promote relaxation. Byosen (scale 1-5) noted at head level 3-4; sacral / root areas at level 3-4. Focused work to these areas. Client restless at beginning of session but became quiet towards end of session. Client Experience: Client stated that she was finally able to relax and doze. Client has taken Reiki Level I class. Recommended Reiki hand positions to use at night to promote relaxation and sleep. Reminded client that she will need to practice hand positions over time to receive benefit. Emotional support offered. Client expressed frustration that more Reiki appointment times not currently available. Will notify [facility] supervisors about client concerns. Return for appointment as soon as she can.      Intake Form Data:  Referral Source: Self referred  Pre-Reiki Therapy (Pain): 0  Pre-Reiki Therapy (Anxiety): 3        Post-Reiki Therapy (Anxiety): 0    Electronically Signed by:    Aiyana Sanchez RN, 9/11/2023     Epic Template #[id number]

## 2023-09-13 LAB — HPV I/H RISK 1 DNA SPEC QL NAA+PROBE: NEGATIVE

## 2023-09-17 DIAGNOSIS — I10 ESSENTIAL HYPERTENSION: ICD-10-CM

## 2023-09-18 RX ORDER — LOSARTAN POTASSIUM 50 MG/1
50 TABLET ORAL EVERY MORNING
Qty: 90 TABLET | Refills: 3 | Status: SHIPPED | OUTPATIENT
Start: 2023-09-18

## 2023-09-27 ENCOUNTER — TELEPHONE (OUTPATIENT)
Dept: FAMILY MEDICINE CLINIC | Facility: CLINIC | Age: 53
End: 2023-09-27

## 2023-09-27 DIAGNOSIS — Z23 ENCOUNTER FOR ADMINISTRATION OF VACCINE: Primary | ICD-10-CM

## 2023-10-02 ENCOUNTER — PATIENT MESSAGE (OUTPATIENT)
Dept: FAMILY MEDICINE CLINIC | Facility: CLINIC | Age: 53
End: 2023-10-02

## 2023-10-11 ENCOUNTER — PATIENT MESSAGE (OUTPATIENT)
Dept: FAMILY MEDICINE CLINIC | Facility: CLINIC | Age: 53
End: 2023-10-11

## 2023-10-12 NOTE — TELEPHONE ENCOUNTER
From: Patricia Quinonez  To: Lisbeth So  Sent: 10/11/2023 11:47 AM CDT  Subject: SHINGLES VACCINE when    I want to confirm and clarify when is my next dose or how early can I get my next dose of Shingles vaccine. I plan to go to [facility] [address] [place]. I attempt to schedule it but  not sure when. Just want to confirm my next dose of shingles/ Zoster/ (Shingrix) vaccine 1st dose 8-9-2023.  Can get this weekend or what date is earliest.   thanks Hieu

## 2023-10-15 ENCOUNTER — OFFICE VISIT (OUTPATIENT)
Dept: FAMILY MEDICINE CLINIC | Facility: CLINIC | Age: 53
End: 2023-10-15
Payer: COMMERCIAL

## 2023-10-15 DIAGNOSIS — Z23 NEED FOR VACCINATION: Primary | ICD-10-CM

## 2023-10-15 PROCEDURE — 90750 HZV VACC RECOMBINANT IM: CPT | Performed by: PHYSICIAN ASSISTANT

## 2023-10-15 PROCEDURE — 90471 IMMUNIZATION ADMIN: CPT | Performed by: PHYSICIAN ASSISTANT

## 2023-10-15 NOTE — PROGRESS NOTES
Patient requesting Shingrix vaccination. Second dose, had last 8/9/23. No adverse reactions in the past to previous vaccinations. Vaccination administration form filled out by patient and reviewed. Benefits and adverse reactions of vaccinations discussed. Patient advised to wait 20 minutes post vaccination to monitor for any immediate side effects. Vaccination given in left deltoid. Patient tolerated well without any issues. Vaccination information sheet also provided with the after visit summary. No questions or concerns at this time. Patient understands and agrees with plan.       Gema Valencia PA-C  10/15/2023  10:44 AM

## 2023-10-18 ENCOUNTER — MED REC SCAN ONLY (OUTPATIENT)
Dept: FAMILY MEDICINE CLINIC | Facility: CLINIC | Age: 53
End: 2023-10-18

## 2023-11-01 ENCOUNTER — OFFICE VISIT (OUTPATIENT)
Dept: SURGERY | Facility: CLINIC | Age: 53
End: 2023-11-01
Payer: COMMERCIAL

## 2023-11-01 DIAGNOSIS — R39.15 URGENCY OF URINATION: Primary | ICD-10-CM

## 2023-11-01 PROCEDURE — 99244 OFF/OP CNSLTJ NEW/EST MOD 40: CPT | Performed by: UROLOGY

## 2023-11-08 ENCOUNTER — PROCEDURE (OUTPATIENT)
Dept: SURGERY | Facility: CLINIC | Age: 53
End: 2023-11-08
Payer: COMMERCIAL

## 2023-11-08 VITALS — DIASTOLIC BLOOD PRESSURE: 86 MMHG | HEART RATE: 84 BPM | SYSTOLIC BLOOD PRESSURE: 129 MMHG

## 2023-11-08 DIAGNOSIS — R39.9 LOWER URINARY TRACT SYMPTOMS: ICD-10-CM

## 2023-11-08 DIAGNOSIS — R39.15 URGENCY OF URINATION: Primary | ICD-10-CM

## 2023-11-08 PROCEDURE — 3074F SYST BP LT 130 MM HG: CPT | Performed by: UROLOGY

## 2023-11-08 PROCEDURE — 3079F DIAST BP 80-89 MM HG: CPT | Performed by: UROLOGY

## 2023-11-08 PROCEDURE — 52000 CYSTOURETHROSCOPY: CPT | Performed by: UROLOGY

## 2023-11-08 NOTE — PROCEDURES
CYSTOSCOPY (FEMALE)    PRE-OP DIAGNOSIS: LUTS    POST-OP DIAGNOSIS: same    PROCEDURE: Cystsocopy    SURGEON: Mariana Keating MD    ASSISTANT: none    EBL: minimal    FINDINGS:   Urethra: No urethral lesions, no urethral strictures  Bladder: Bilateral ureteral orifices in orthotopic position with efflux, no suspicious or concerning erythematous lesions, no papillary bladder tumors, no stones   Retroflexion: no abnormalities   Other findings: no prolapse    INDICATIONS: LUTS. PROCEDURE: Patient was brought to the procedure suite and a timeout was performed identifying the patient,  and procedure being performed. The risks of the procedure were once again detailed to the patient including bleeding, infection, dysuria. The patient agreed to proceed. The patient had a negative urinalysis. No antibiotics were given to patient prior to this procedure. She was placed in a supine position on the table and a flexible cystoscope was inserted per urethra. There were no obvious urethral lesions or strictures. Once in the bladder we performed a full diagnostic/surveillance cystoscopy which demonstrated no abnormalities. On retroflexion we noted no abnormalities. The scope was then carefully removed and once again no urethral abnormalities were noted. There were no complications after this procedure and the patient tolerated the procedure without issue. IMPRESSION: cysto negative. Pelvic negative. Offered mirabegron with the understanding that it takes 6 to 8 weeks to start working and would help with irritative voiding symptoms.       PLAN:    She wants to hold off on estrace and mirabegron

## 2023-12-04 ENCOUNTER — NURSE ONLY (OUTPATIENT)
Dept: INTEGRATIVE MEDICINE | Facility: CLINIC | Age: 53
End: 2023-12-04

## 2023-12-05 ENCOUNTER — TELEPHONE (OUTPATIENT)
Dept: FAMILY MEDICINE CLINIC | Facility: CLINIC | Age: 53
End: 2023-12-05

## 2023-12-05 ENCOUNTER — PATIENT MESSAGE (OUTPATIENT)
Dept: FAMILY MEDICINE CLINIC | Facility: CLINIC | Age: 53
End: 2023-12-05

## 2023-12-05 DIAGNOSIS — J45.40 MODERATE PERSISTENT ASTHMA WITHOUT COMPLICATION: ICD-10-CM

## 2023-12-05 NOTE — TELEPHONE ENCOUNTER
Pt would like a refill on her zaleplon 5milligrams one tablet (not listed) Pharmacy: [address], [place] (listed)

## 2023-12-05 NOTE — PROGRESS NOTES
Reiki Follow Up Note    Patient Name: Luz Puga   YOB: 1970   Medical Record Number: [id number]   CSN: [id number]     Date of Visit: 12/4/2023    Reason for scheduling reiki session: No chief complaint on file. Relaxation  Name of referring physician: No ref. provider found  Self referred    Overall health status: Client continues to deal with chronic insomnia. Has also developed issues with overactive bladder at night--getting up 4-6 times every night. Client is seeking medical care for both issues. Client has taken Reiki I class. States that she self-treats frequently, often at bedtime and during the night by placing her hands over lower abdomen/ bladder area. Client stated \"I need to be more patient. \"    Observation: Session began with series of head to toe sweeps, deep breaths and guided imagery to promote relaxation. Byosen (scale 1-5) noted at crown / forehead level 3+; mid abdomen at level 3-4; lower abdomen / root area at level 4. Focused work to these areas with strong energetic connection. Client rested quietly and appeared to sleep almost immediately. Mantra during session--\"patience, peace and healing\". Client Experience: Client was surprised at how quickly she fell asleep. Described relaxation. Emotional support offered to client. Answered questions about her personal Reiki practice and encouraged her to continue daily self-treatment. Client is interested in taking Level II class. Will notify her if class is offered. Reviewed Resources for Resilience hand positions for stress relief. Return for session prn.      Intake Form Data:                      Electronically Signed by:    Carmela Díaz RN, 12/5/2023     Epic Template #[id number]

## 2023-12-06 RX ORDER — ALBUTEROL SULFATE 90 UG/1
2 AEROSOL, METERED RESPIRATORY (INHALATION) EVERY 4 HOURS PRN
Qty: 3 EACH | Refills: 5 | Status: SHIPPED | OUTPATIENT
Start: 2023-12-06

## 2023-12-06 NOTE — TELEPHONE ENCOUNTER
Refill passed per [place] [employer] protocol. Requested Prescriptions   Pending Prescriptions Disp Refills    albuterol (PROAIR HFA) 108 (90 Base) MCG/ACT Inhalation Aero Soln 25.5 g 1     Sig: Inhale 2 puffs into the lungs every 4 (four) hours as needed for Wheezing.        Asthma & COPD Medication Protocol Passed - 12/5/2023  8:56 PM        Passed - In person appointment or virtual visit in the past 6 mos or appointment in next 3 mos     Recent Outpatient Visits              2 days ago     [address], [address], [address] Roosevelt Nelson, RN    Nurse Only    1 month ago Urgency of urination    [address],[place] [address], [place]    Office Visit    1 month ago Need for vaccination    [address], [facility], [address] [place], [place]    Office Visit    2 months ago     [address], [address], RN    Nurse Only    2 months ago Screening for cervical cancer    [address], Damien Acosta MD    Office Visit          Future Appointments         Provider Department Appt Notes    In 1 week [facility]                   Future Appointments         Provider Department Appt Notes    In 1 week [facility] Outpatient Visits              2 days ago     [address], [address], RN    Nurse Only    1 month ago Urgency of urination    [facility] [place], [place], Cayetano Espinosa MD    Office Visit    1 month ago Need for vaccination    [facility], [facility], [facility], [address] [place], [place]    Office Visit    2 months ago     [address], [address], [address] Vasquez Agarwal, RN    Nurse Only    2 months ago Screening for cervical cancer    Karla Fleming, Velma Lopez MD    Office Visit

## 2023-12-07 RX ORDER — ZALEPLON 5 MG/1
5 CAPSULE ORAL NIGHTLY
Qty: 30 CAPSULE | Refills: 2 | Status: SHIPPED | OUTPATIENT
Start: 2023-12-07 | End: 2024-03-06

## 2023-12-07 NOTE — TELEPHONE ENCOUNTER
Dr. Blackwood Daughters, please see patient's request for Zaleplon. Inhaler already sent yesterday. From: Luz Puga  To: Xander Dove  Sent: 12/5/2023  9:13 PM CST  Subject: MEDICATION ZALEPLON 5MG/ ALBUTEROL clarification    HI DR Britney Ledesma,   I heard that you are leaving. I want to have my medication refills in order before you leave [facility]. 1) Some how the order you escribe Zaleplon 5mg daily is not on refill list but it shown on my after care summary as being prescribed. Please order ( for sleep) Zaleplon 5mg daily (qty 30)  request 2 refills to [facility] pharmacy. It can only be prescribed 30days!!!    2) Please escribe Albuterol (PROAir generic only) . It on refill list but there error on qty of 2. Please scribe qty 3  for  [facility]    Thank you and Best wishes/ Good luck to you  Kawkawlin Hippo

## 2023-12-13 ENCOUNTER — HOSPITAL ENCOUNTER (OUTPATIENT)
Dept: MAMMOGRAPHY | Age: 53
Discharge: HOME | End: 2023-12-13
Attending: OBSTETRICS & GYNECOLOGY
Payer: COMMERCIAL

## 2023-12-13 DIAGNOSIS — Z12.31 BREAST CANCER SCREENING BY MAMMOGRAM: ICD-10-CM

## 2023-12-13 PROCEDURE — 77067 SCR MAMMO BI INCL CAD: CPT | Performed by: OBSTETRICS & GYNECOLOGY

## 2023-12-13 PROCEDURE — 77063 BREAST TOMOSYNTHESIS BI: CPT | Performed by: OBSTETRICS & GYNECOLOGY

## 2023-12-14 NOTE — PROGRESS NOTES
Released to [employer] and message from provider/results was viewed by patient.     Seen by patient Nimesh Alfonso on 12/13/2023 10:06 PM

## 2023-12-19 ENCOUNTER — PATIENT MESSAGE (OUTPATIENT)
Dept: FAMILY MEDICINE CLINIC | Facility: CLINIC | Age: 53
End: 2023-12-19

## 2023-12-20 NOTE — TELEPHONE ENCOUNTER
From: Kenan Cavanaugh  [facility]  Sent: 12/19/2023 11:25 PM CST  Subject: Pulmonologist     Any recommendations on pulmonary dr to see via Swati / duly to see I want try again to go back Cpap. ( h/o apnea which causing sleep issue) . Since my previous dr left to a different practice.  Thanks Shabbir Rodriguez

## 2023-12-21 ENCOUNTER — PATIENT MESSAGE (OUTPATIENT)
Dept: SURGERY | Facility: CLINIC | Age: 53
End: 2023-12-21

## 2023-12-22 NOTE — TELEPHONE ENCOUNTER
This encounter is now closed. Mirabegron order placed.   LOV on 23 with Dr Ligia Torrez:     PLAN:  Estrace cream  Behavioral management  Cystoscopy, pelvic exam  Can consider mirabegron 50mg daily    [address] Zoraida Apgar, Berenda Hawthorn - [address] [place] [address] , [phone]

## 2023-12-23 ENCOUNTER — PATIENT MESSAGE (OUTPATIENT)
Dept: SURGERY | Facility: CLINIC | Age: 53
End: 2023-12-23

## 2023-12-27 NOTE — TELEPHONE ENCOUNTER
Hi all  Georgi Britt was correct that she can start with 25mg and move to 50mg, however usually need a different Rx for the 25mg. I usually prescribed 50mg to start as the blood pressure issues are relatively rare. For patients that want to be cautious, which is very reasonable, they can start with 25mg, but would need a different prescription. I just wrote her for 25mg (only a 30 day supply) which she can start to take. If she does not have BP issues, can plan to increase to 50mg (which is the prescription she already picked up). Please convey to patient. Georgi Britt, thanks for you help.

## 2024-01-29 ENCOUNTER — NURSE ONLY (OUTPATIENT)
Dept: INTEGRATIVE MEDICINE | Facility: CLINIC | Age: 54
End: 2024-01-29

## 2024-01-29 PROCEDURE — 99199 UNLISTED SPECIAL SVC PX/RPRT: CPT

## 2024-01-31 NOTE — PROGRESS NOTES
Reiki Follow Up Note    Patient Name: Yara Westfall   YOB: 1970   Medical Record Number: [id number]   CSN: [id number]     Date of Visit: 1/29/2024    Reason for scheduling reiki session: No chief complaint on file.  Relaxation and release of stress      Name of referring physician: No ref. provider found  Self referred .  Overall health status: Client states that she continues to deal with urinary urgency but is finding her yoga practice to be helpful. Issues with sleep also continue. States that her job is going well.  Finds that regular Reiki sessions are helpful in managing stress.  Intention for this session is to be calm. States that she is feeling the effects of world chaos. States that she is very sensitive to this.     Observation: Session began with series of head to toe sweeps, deep breaths and guided imagery to promote relaxation.  Client was restless at beginning of session.  Byosen (scale 1-5) noted at heart / mid abdomen at level 3; root / sacral at level 3-4.  Focused work to these areas as well as standard hand placements. Client settled and rested quietly for last half of session.      Client Experience: Client stated that she felt more peaceful.  Emotional support offered. Encouraged client to use self-care hand placements (Resources for Resilience) when feeling anxious or stressed.  Return for session prn.     Intake Form Data:     Pre-Reiki Therapy (Pain): 0  Pre-Reiki Therapy (Anxiety): 7     Post-Reiki Therapy (Pain): 0  Post-Reiki Therapy (Anxiety): 3    Electronically Signed by:    Janae YEBOAH RN, 1/30/2024     Epic Template #[id number]

## 2024-02-09 RX ORDER — MIRABEGRON 25 MG/1
25 TABLET, FILM COATED, EXTENDED RELEASE ORAL DAILY
Qty: 30 TABLET | Refills: 1 | Status: SHIPPED | OUTPATIENT
Start: 2024-02-09

## 2024-02-09 NOTE — TELEPHONE ENCOUNTER
Received call from the phone room. Sent in temporary refill. I advised JOSETTE's that patient should schedule a follow up visit to further discuss medication.

## 2024-02-14 ENCOUNTER — TELEPHONE (OUTPATIENT)
Dept: SURGERY | Facility: CLINIC | Age: 54
End: 2024-02-14

## 2024-02-14 RX ORDER — MIRABEGRON 25 MG/1
25 TABLET, FILM COATED, EXTENDED RELEASE ORAL DAILY
Qty: 90 TABLET | Refills: 3 | Status: SHIPPED | OUTPATIENT
Start: 2024-02-14

## 2024-02-14 NOTE — TELEPHONE ENCOUNTER
Refill for Mirabegron sent to pharmacy per protocol.     Overactive Bladder Medications    Protocol criteria:  Appointment scheduled in the past 12 months or in the next 2 months      Recent Outpatient Visits              2 weeks ago     [facility] [place], Kiel Van de Velde, Janae, RN    Nurse Only    2 months ago     [facility] [place], Kiel [place], Janae, RN    Nurse Only    3 months ago Urgency of urination    [facility] Sherry Vallejo MD    Office Visit    4 months ago Need for vaccination    [facility] Gema Valencia PA-C    Office Visit    5 months [facility]    Nurse Only          Future Appointments         Provider Department Appt Notes    In 1 month [facility]    In 1 month [facility] men[facility]

## 2024-02-14 NOTE — TELEPHONE ENCOUNTER
Patient is upset that she had to wait 2 weeks for rx Myrbetriq and requesting for 3 month supply, that would cost $40 verses $80. Patient requesting to speak with nurse now, she has an appointment in April. Please call at [phone],thanks.

## 2024-02-28 ENCOUNTER — NURSE ONLY (OUTPATIENT)
Dept: INTEGRATIVE MEDICINE | Facility: CLINIC | Age: 54
End: 2024-02-28

## 2024-02-28 PROCEDURE — 99199 UNLISTED SPECIAL SVC PX/RPRT: CPT

## 2024-02-28 NOTE — PROGRESS NOTES
Reiki Follow Up Note    Patient Name: Yara Westfall   YOB: 1970   Medical Record Number: [id number]   CSN: [id number]     Date of Visit: 2/28/2024    Reason for scheduling reiki session: No chief complaint on file.  Stress management  Name of referring physician: No ref. provider found  Self referred  Overall health status: Continues to deal with insomnia, urinary urgency / frequency.  Finds that coming for Reiki sessions is helpful for her. \"I need more patience.\"    Observation: Session began with head to toe sweeps, deep breaths and guided imagery to promote relaxation.  Focus to crown, heart and lower abdomen. Strong connection over bladder area.  Client restless to begin, then settled and slept.      Client Experience: Described relaxation.  Emotional support offered.  Encouraged client to continue with Reiki self care. Return prn.     Intake Form Data:     Pre-Reiki Therapy (Pain): 0  Pre-Reiki Therapy (Anxiety): 7     Post-Reiki Therapy (Pain): 0  Post-Reiki Therapy (Anxiety): 2    Electronically Signed by:    Janae YEBOAH RN, 2/28/2024     Epic Template #[id number]

## 2024-03-17 ENCOUNTER — OFFICE VISIT (OUTPATIENT)
Dept: FAMILY MEDICINE CLINIC | Facility: CLINIC | Age: 54
End: 2024-03-17
Payer: COMMERCIAL

## 2024-03-17 VITALS
RESPIRATION RATE: 20 BRPM | SYSTOLIC BLOOD PRESSURE: 136 MMHG | OXYGEN SATURATION: 97 % | DIASTOLIC BLOOD PRESSURE: 86 MMHG | HEIGHT: 64 IN | TEMPERATURE: 98 F | WEIGHT: 156.19 LBS | BODY MASS INDEX: 26.67 KG/M2 | HEART RATE: 93 BPM

## 2024-03-17 DIAGNOSIS — J45.909 MILD ASTHMA, UNSPECIFIED WHETHER COMPLICATED, UNSPECIFIED WHETHER PERSISTENT (HCC): ICD-10-CM

## 2024-03-17 DIAGNOSIS — J20.9 ACUTE BRONCHITIS, UNSPECIFIED ORGANISM: Primary | ICD-10-CM

## 2024-03-17 PROCEDURE — 99213 OFFICE O/P EST LOW 20 MIN: CPT | Performed by: NURSE PRACTITIONER

## 2024-03-17 RX ORDER — PREDNISONE 20 MG/1
20 TABLET ORAL DAILY
Qty: 5 TABLET | Refills: 0 | Status: SHIPPED | OUTPATIENT
Start: 2024-03-17 | End: 2024-03-22

## 2024-03-17 RX ORDER — ALBUTEROL SULFATE 90 UG/1
2 AEROSOL, METERED RESPIRATORY (INHALATION) EVERY 4 HOURS PRN
Qty: 6.7 G | Refills: 0 | Status: SHIPPED | OUTPATIENT
Start: 2024-03-17

## 2024-03-17 RX ORDER — AZITHROMYCIN 500 MG/1
500 TABLET, FILM COATED ORAL DAILY
Qty: 3 TABLET | Refills: 0 | Status: SHIPPED | OUTPATIENT
Start: 2024-03-17 | End: 2024-03-20

## 2024-03-17 NOTE — PROGRESS NOTES
CHIEF COMPLAINT:     Chief Complaint   Patient presents with    Cough     H/o asthma - Entered by patient    Cough, yellow mucus x9days        HPI:   Yara Westfall is a 53 year old female who presents with c/o URI symptoms. Patient reports that she developed a cough with nasal congestion and sore throat on 3/9. Her cough is now productive with yellow sputum. Patient has a h/o asthma and seasonal allergies. Reportedly takes Zyrtec, Benadryl, Advair, and Albuterol inhaler. Denies any increase usage of her inhaler. Does not follow with Pulmonology and is in between PCPs. Denies wheezing, sob, cp, ear pain, sinus pressure, n/v/d, or smoking history. Denies PFT testing in the past.     Current Outpatient Medications   Medication Sig Dispense Refill    Mirabegron ER (MYRBETRIQ) 25 MG Oral Tablet 24 Hr Take 1 tablet (25 mg total) by mouth daily. 90 tablet 3    Zaleplon 5 MG Oral Cap Take 1 capsule (5 mg total) by mouth nightly.      albuterol (PROAIR HFA) 108 (90 Base) MCG/ACT Inhalation Aero Soln Inhale 2 puffs into the lungs every 4 (four) hours as needed for Wheezing. 3 each 5    losartan 50 MG Oral Tab Take 1 tablet (50 mg total) by mouth every morning. 90 tablet 3    Ergocalciferol (VITAMIN D2) 50 MCG (2000 UT) Oral Tab       Levalbuterol HCl 1.25 MG/3ML Inhalation Nebu Soln       fluticasone-salmeterol 250-50 MCG/ACT Inhalation Aerosol Powder, Breath Activated Inhale 1 puff into the lungs every 12 (twelve) hours. 3 each 0    MAGNESIUM GLYCINATE  mg.      cetirizine 10 MG Oral Tab Take 1 tablet (10 mg total) by mouth daily as needed.      fluticasone propionate (FLONASE) 50 MCG/ACT Nasal Suspension       melatonin 3 MG Oral Tab       omega-3 fatty acids 1000 MG Oral Cap Take by mouth daily.      Calcium Carb-Cholecalciferol 600-400 MG-UNIT Oral Tab       Doxylamine Succinate, Sleep, 25 MG Oral Tab         Past Medical History:   Diagnosis Date    Allergic rhinitis     Seldom / more occurence when rains     Amenorrhea     had not had [id number]    Asthma (HCC)     Essential hypertension     Dx late 30’s    Sleep apnea 10/27/2014    not compliant with cpap since . doesnt tolerate cpap      Past Surgical History:   Procedure Laterality Date    COLONOSCOPY  2022    D & C  2006    2 miscarriages      O4-18-03    Also 2/15/2007 another childbirth         Social History     Socioeconomic History    Marital status:    Occupational History     Employer: [employer]    Occupation: Nurse   Tobacco Use    Smoking status: Never    Smokeless tobacco: Never   Vaping Use    Vaping Use: Never used   Substance and Sexual Activity    Alcohol use: Not Currently     Comment: rarely drink .  Only special occasions.    Drug use: Never    Sexual activity: Yes     Partners: Male   Other Topics Concern    Caffeine Concern No    Exercise No    Seat Belt No    Special Diet No    Stress Concern Yes    Weight Concern Yes     Comment: Hard to lose weight as get older    Blood Transfusions No         REVIEW OF SYSTEMS:   GENERAL: Unchanged appetite  SKIN: no rashes or abnormal skin lesions  HEENT: See HPI  LUNGS: denies shortness of breath or wheezing, See HPI  CARDIOVASCULAR: denies chest pain or palpitations   GI: denies N/V/C or abdominal pain  NEURO: Denies dizziness or weakness    EXAM:   /86 (BP Location: Right arm, Patient Position: Sitting, Cuff Size: adult)   Pulse 93   Temp 98 °F (36.7 °C) (Oral)   Resp 20   Ht 5' 4\" (1.626 m)   Wt 156 lb 3.2 oz (70.9 kg)   SpO2 97%   BMI 26.81 kg/m²   GENERAL: well developed, well nourished,in no apparent distress  SKIN: no rashes,no suspicious lesions  HEAD: atraumatic, normocephalic.  Denies tenderness on palpation of maxillary/frontal sinuses  EYES: conjunctiva clear, EOM intact  EARS: TM's pearly/gray, no bulging, no retraction, no fluid, bony landmarks present. +Cerumen.  NOSE: Nostrils patent, clear nasal discharge, nasal mucosa pink.   THROAT: Oral mucosa  pink, moist. Posterior pharynx is pink. +Mucus sputum. No exudates. Tonsils 1/4.    NECK: Supple, non-tender  LUNGS: clear to auscultation bilaterally, no wheezes or rhonchi. Breathing is non labored.  CARDIO: RRR without murmur  EXTREMITIES: no cyanosis, clubbing or edema  LYMPH:  No head or neck lymphadenopathy.        ASSESSMENT AND PLAN:   Yara Westfall is a 53 year old female who presents with upper respiratory symptoms that are consistent with    ASSESSMENT:   Encounter Diagnoses   Name Primary?    Mild asthma, unspecified whether complicated, unspecified whether persistent (HCC)     Acute bronchitis, unspecified organism Yes         PLAN:   Requesting Albuterol inhaler.   Will treat with Azithromycin 500 mg x 3 days for anti-inflammatory and Prednisone 20 mg x 5 days. Symptoms likely viral etiology. No wheeze heard on exam with CTA breath sounds, no warranting CXR. VS stable. NAD.   Encouraged patient to f/u with PCP and possibly Pulm with PFTs.   Comfort care per pt instructions.   To follow up for any new or worsening symptoms.   To go to the ER for any severe symptoms such as difficulty breathing or chest pain.     Meds & Refills for this Visit:  Requested Prescriptions     Signed Prescriptions Disp Refills    azithromycin 500 MG Oral Tab 3 tablet 0     Sig: Take 1 tablet (500 mg total) by mouth daily for 3 days.    predniSONE 20 MG Oral Tab 5 tablet 0     Sig: Take 1 tablet (20 mg total) by mouth daily for 5 days.    albuterol 108 (90 Base) MCG/ACT Inhalation Aero Soln 6.7 g 0     Sig: Inhale 2 puffs into the lungs every 4 (four) hours as needed for Wheezing.       Risks, benefits, and side effects of medication explained and discussed.    Patient Instructions   Symptoms likely viral. Patient is out of the window for respiratory viral testing.   May take Tylenol/Ibuprofen for pain.  Supportive care.  Increase oral intake of warm fluids, such as hot tea with honey and lemon.  May gargle with warm  salt water.  May use Nedi-pot with distilled water only.  OTC Medications for symptoms.  Encouraged to use Debrox for cerumen.  If symptoms do not get better around day 10 or are worse please follow up with your PCP or RTC.    The patient indicates understanding of these issues and agrees to the plan.  The patient is asked to return if sx's persist or worsen.

## 2024-03-17 NOTE — PATIENT INSTRUCTIONS
Symptoms likely viral. Patient is out of the window for respiratory viral testing.   May take Tylenol/Ibuprofen for pain.  Supportive care.  Increase oral intake of warm fluids, such as hot tea with honey and lemon.  May gargle with warm salt water.  May use Nedi-pot with distilled water only.  OTC Medications for symptoms.  Encouraged to use Debrox for cerumen.  If symptoms do not get better around day 10 or are worse please follow up with your PCP or RTC.

## 2024-04-03 ENCOUNTER — OFFICE VISIT (OUTPATIENT)
Dept: SURGERY | Facility: CLINIC | Age: 54
End: 2024-04-03
Payer: COMMERCIAL

## 2024-04-03 DIAGNOSIS — R39.15 URGENCY OF URINATION: Primary | ICD-10-CM

## 2024-04-03 PROCEDURE — 99214 OFFICE O/P EST MOD 30 MIN: CPT | Performed by: UROLOGY

## 2024-04-03 NOTE — PROGRESS NOTES
Sherry Vallejo MD  Department of Urology  [address] [place] [address] Rd[address]  [place] [address]    T: [phone]  F: [phone]    To: Nan Hurtado MD   [address] [place] [id number]    Re: Yara Westfall   MRN: [id number]  : 1970    Dear Nan Hurtado MD,    Today I had the pleasure of seeing Yara Westfall in my clinic. As you know, Ms. Westfall is a pleasant 53 year old year old female who I am seeing for UUI. Patient was last seen in this department on 2023.    Briefly, patient recently saw her integrative medicine provider in 2023. At that time she had reported urgency, frequency. She also has difficulty sleeping/insomnia. Please note that she also has sleep apnea and does not tolerate CPAP.     I recommended behavioral management, Estrace cream and mirabegron.  She stated she wanted to hold off on Estrace and mirabegron.  She contacted us at the end of December and requested mirabegron 25 mg.    She did undergo a cystoscopy and pelvic exam in the interim which demonstrated no prolapse.    Today she states that she had no side effects mirabegron 25 mg but she feels that it has not provided significant benefit.       PAST MEDICAL HISTORY:  Past Medical History:   Diagnosis Date    Allergic rhinitis     Seldom / more occurence when rains    Amenorrhea     had not had [id number]    Asthma (HCC)     Essential hypertension     Dx late 30’s    Sleep apnea 10/27/2014    not compliant with cpap since . doesnt tolerate cpap        PAST SURGICAL HISTORY:  Past Surgical History:   Procedure Laterality Date    COLONOSCOPY  2022    D & C  2006    2 miscarriages      O4-18-03    Also 2/15/2007 another childbirth         ALLERGIES:  No Known Allergies      MEDICATIONS:  Current Outpatient Medications   Medication Instructions    albuterol (PROAIR HFA) 108 (90 Base) MCG/ACT Inhalation Aero Soln 2 puffs, Inhalation, Every 4 hours PRN    albuterol 108  (90 Base) MCG/ACT Inhalation Aero Soln 2 puffs, Inhalation, Every 4 hours PRN    Calcium Carb-Cholecalciferol 600-400 MG-UNIT Oral Tab No dose, route, or frequency recorded.    cetirizine (ZYRTEC) 10 mg, Oral, Daily as needed    Doxylamine Succinate, Sleep, 25 MG Oral Tab No dose, route, or frequency recorded.    Ergocalciferol (VITAMIN D2) 50 MCG (2000 UT) Oral Tab     fluticasone propionate (FLONASE) 50 MCG/ACT Nasal Suspension No dose, route, or frequency recorded.    fluticasone-salmeterol 250-50 MCG/ACT Inhalation Aerosol Powder, Breath Activated 1 puff, Inhalation, Every 12 hours scheduled    Levalbuterol HCl 1.25 MG/3ML Inhalation Nebu Soln     losartan (COZAAR) 50 mg, Oral, Every morning    MAGNESIUM GLYCINATE  mg.    melatonin 3 MG Oral Tab No dose, route, or frequency recorded.    Myrbetriq 25 mg, Oral, Daily    omega-3 fatty acids 1000 MG Oral Cap Oral, Daily    Zaleplon (SONATA) 5 mg, Oral, Nightly        FAMILY HISTORY:  Family History   Problem Relation Age of Onset    Hypertension Mother     Lipids Mother     Asthma Father     Hypertension Father     Lipids Father     Other (Ovarian tumor) Sister 37        s/p resection, but still being monitored for cancer q 6months    Diabetes Maternal Grandmother     Dementia Maternal Grandfather     Asthma Paternal Grandmother     Cancer Paternal Grandfather         do not  know name. He  young.    Breast Cancer Neg     Colon Cancer Neg     Uterine Cancer Neg         SOCIAL HISTORY:  Social History     Socioeconomic History    Marital status:    Occupational History     Employer: [employer]    Occupation: Nurse   Tobacco Use    Smoking status: Never    Smokeless tobacco: Never   Vaping Use    Vaping Use: Never used   Substance and Sexual Activity    Alcohol use: Not Currently     Comment: rarely drink .  Only special occasions.    Drug use: Never    Sexual activity: Yes     Partners: Male   Other Topics Concern    Caffeine Concern No     Exercise No    Seat Belt No    Special Diet No    Stress Concern Yes    Weight Concern Yes     Comment: Hard to lose weight as get older    Blood Transfusions No          PHYSICAL EXAMINATION:  There were no vitals filed for this visit.  CONSTITUTIONAL: No apparent distress, cooperative and communicative  NEUROLOGIC: Alert and oriented   HEAD: Normocephalic, atraumatic   EYES: Sclera non-icteric   ENT: Hearing intact, moist mucous membranes   NECK: No obvious goiter or masses   RESPIRATORY: Normal respiratory effort, Nonlabored breathing on room air  SKIN: No evident rashes   ABDOMEN: Soft, nontender, nondistended, no rebound tenderness, no guarding, no masses      REVIEW OF SYSTEMS:    A comprehensive 10-point review of systems was completed.  Pertinent positives and negatives are noted in the the HPI.       LABORATORY DATA:  Urine Color  Yellow Light-Yellow   Clarity Urine  Clear Turbid Abnormal    Spec Gravity  1.005 - 1.030 1.017   Glucose Urine  Normal mg/dL Normal   Bilirubin Urine  Negative Negative   Ketones Urine  Negative mg/dL Negative   Blood Urine  Negative Negative   pH Urine  5.0 - 8.0 7.0   Protein Urine  Negative, Trace mg/dL Negative   Urobilinogen Urine  Normal Normal   Nitrite Urine  Negative Negative   Leukocyte Esterase Urine  Negative Negative   WBC Urine  0 - 5 /HPF None Seen   RBC Urine  0 - 2 /HPF 0-2   Bacteria Urine  None Seen /HPF None Seen   Squamous Epi. Cells  None Seen /HPF Few Abnormal          Component  Ref Range & Units 8/23/23  9:32 AM   HgbA1C  <5.7 % 5.5   Comment:  Normal HbA1C:     <5.7%   Pre-Diabetic:     5.7 - 6.4%   Diabetic:         >6.4%   Diabetic Control: <7.0%     Estimated Average Glucose  68 - 126 mg/dL 111        ef Range & Units 9/11/23 11:38 AM   HPV High Risk  Negative Negative   HPV Source Cervical/endocervical   Resulting Agency [facility] ([facility])           IMAGING REVIEW:  Irritative voiding symptoms recommended behavioral management, Estrace cream and  john.PROCEDURE: [facility] 2D+3D SCREENING BILAT ([id number])     COMPARISON: External Exams, [facility] 2D+3D SCREENING BILAT ([id number]), 12/17/2019, 9:56 AM.  External Exams, [facility] 2D+3D SCREENING BILAT ([id number]), 12/22/2020, 7:30 AM.  External Exams, [facility] 2D+3D SCREENING BILAT ([id number]), 12/18/2021,  10:38 AM.  [facility], [facility] 2D+3D SCREENING BILAT ([id number]), 12/09/2022, 4:31 PM.     INDICATIONS: Z12.31 Breast cancer screening by mammogram     TECHNIQUE: Full field direct screening mammography was performed and images were reviewed with the [employer] DAMIAN 1.5.1.5 CAD device.  3D tomosynthesis was performed and reviewed        BREAST COMPOSITION:   Category b-Scattered areas fibroglandular density.        FINDINGS: There is no suspicious asymmetry, mass, architectural distortion, or microcalcifications identified in either breast.                    Impression   CONCLUSION:   There is no mammographic evidence of malignancy in either breast. As long as patient's clinical breast exam remains unchanged, annual screening mammogram is recommended.     BI-RADS CATEGORY:    DIAGNOSTIC CATEGORY 1--NEGATIVE ASSESSMENT.       RECOMMENDATIONS:  ROUTINE MAMMOGRAM AND CLINICAL EVALUATION IN 12 MONTHS.                   PLEASE NOTE: NORMAL MAMMOGRAM DOES NOT EXCLUDE THE POSSIBILITY OF BREAST CANCER.  A CLINICALLY SUSPICIOUS PALPABLE LUMP SHOULD BE BIOPSIED.       For patients over the age of 40, the target due date for the patient's next mammogram has been entered into a reminder system.       Patient received a discharge summary from the technologist after completion of exam.     Breast marker legend used on images    Triangle = Palpable lump  [place] = Skin tag or mole  BB = Nipple  Linear kasey = Scar  Square = Pain           Dictated by (CST): Simón Pate DO on 12/13/2023 at 5:04 PM      Finalized by (CST): Simón Pate DO on 12/13/2023 at 5:04 PM           Result  History    [facility] 2D+3D SCREENING BILAT (CPT=[id number]) (Order #[id number]) on 12/13/2023 - Order Result History Report  Signed by       OTHER RELEVANT DATA:   none     IMPRESSION: Irritative voiding symptoms currently on behavioral management and mirabegron 25 mg.  I had offered her behavioral management, Estrace cream and mirabegron 50 mg.  She states that her current regimen is moderate and would like to increase the dose to 50 mg.  She knows to monitor her blood pressure with her primary care doctor.    I offered her Estrace cream however she is considering hormone replacement therapy with her gynecologist and would like to hold off for now.     PLAN:  Mirabegron 50 mg prescribed  Return to clinic in 3 and half months  She is can let me know if she is interested in Estrace cream    Thank you for referring this very pleasant patient to my clinic. If you have any questions or concerns, please do not hesitate to contact me.    Sincerely,  Sherry Vallejo MD    30 minutes were spent on this patient at this visit obtaining a history, reviewing medical records, developing a treatment plan, counseling and discussing treatment strategy with patient, coordination of care and documentation.     The 21st Century Cures Act makes medical notes available to patients in the interest of transparency.  However, please be advised that this is a medical document.  It is intended as a peer to peer communication.  It is written in medical language and may contain abbreviations or verbiage that are technical and unfamiliar.  It may appear blunt or direct.  Medical documents are intended to carry relevant information, facts as evident, and the clinical opinion of the practitioner.

## 2024-04-13 ENCOUNTER — PATIENT MESSAGE (OUTPATIENT)
Dept: FAMILY MEDICINE CLINIC | Facility: CLINIC | Age: 54
End: 2024-04-13

## 2024-04-13 ENCOUNTER — DOCUMENTATION ONLY (OUTPATIENT)
Dept: FAMILY MEDICINE CLINIC | Facility: CLINIC | Age: 54
End: 2024-04-13

## 2024-04-13 ENCOUNTER — OFFICE VISIT (OUTPATIENT)
Dept: FAMILY MEDICINE CLINIC | Facility: CLINIC | Age: 54
End: 2024-04-13
Payer: COMMERCIAL

## 2024-04-13 VITALS
BODY MASS INDEX: 26.8 KG/M2 | WEIGHT: 157 LBS | DIASTOLIC BLOOD PRESSURE: 75 MMHG | HEART RATE: 84 BPM | TEMPERATURE: 99 F | HEIGHT: 64 IN | SYSTOLIC BLOOD PRESSURE: 127 MMHG | OXYGEN SATURATION: 96 %

## 2024-04-13 DIAGNOSIS — Z01.84 IMMUNITY STATUS TESTING: ICD-10-CM

## 2024-04-13 DIAGNOSIS — Z00.00 WELL ADULT EXAM: Primary | ICD-10-CM

## 2024-04-13 DIAGNOSIS — J45.40 MODERATE PERSISTENT ASTHMA WITHOUT COMPLICATION (HCC): ICD-10-CM

## 2024-04-13 DIAGNOSIS — I10 ESSENTIAL HYPERTENSION: ICD-10-CM

## 2024-04-13 PROCEDURE — 99396 PREV VISIT EST AGE 40-64: CPT | Performed by: STUDENT IN AN ORGANIZED HEALTH CARE EDUCATION/TRAINING PROGRAM

## 2024-04-13 PROCEDURE — 96127 BRIEF EMOTIONAL/BEHAV ASSMT: CPT | Performed by: STUDENT IN AN ORGANIZED HEALTH CARE EDUCATION/TRAINING PROGRAM

## 2024-04-13 NOTE — PROGRESS NOTES
HPI:    Patient ID: Yara Westfall is a 53 year old female.    HPI  Pt presenting for routine physical exam. Denies any acute issues or recent illnesses. No significant chronic medical problems. Past medical/surgical history, family history, and social history were reviewed.     Going through menopause  Poor sleep  Interested in HRT    Body aches for the last few weeks overnight  Naproxen PRN  Works as pediatric RN, physical activity  Hydrating well    H/o measles  MMR 10/7/2013    FHx varicose veins  Notes leg cramping  Wearing compression support    H/o MARYELLEN    Mood stable, denies SH/SI/HI    Review of Systems   A comprehensive 10 point review of systems was completed.  Pertinent positives and negatives noted in the the HPI.       Current Outpatient Medications   Medication Sig Dispense Refill    Mirabegron ER 50 MG Oral Tablet 24 Hr Take 1 tablet (50 mg total) by mouth daily. 90 tablet 3    Zaleplon 5 MG Oral Cap Take 1 capsule (5 mg total) by mouth nightly.      Ergocalciferol (VITAMIN D2) 50 MCG (2000 UT) Oral Tab       MAGNESIUM GLYCINATE  mg.      cetirizine 10 MG Oral Tab Take 1 tablet (10 mg total) by mouth daily as needed.      fluticasone propionate (FLONASE) 50 MCG/ACT Nasal Suspension       melatonin 3 MG Oral Tab       omega-3 fatty acids 1000 MG Oral Cap Take by mouth daily.      Calcium Carb-Cholecalciferol 600-400 MG-UNIT Oral Tab       Doxylamine Succinate, Sleep, 25 MG Oral Tab       progesterone 100 MG Oral Cap Take 1 capsule (100 mg total) by mouth nightly. 90 capsule 3    estradiol 0.05 MG/24HR Transdermal Patch Weekly Place 1 patch onto the skin once a week. 4 each 11    losartan 50 MG Oral Tab Take 1 tablet (50 mg total) by mouth every morning. 90 tablet 3    albuterol (PROAIR HFA) 108 (90 Base) MCG/ACT Inhalation Aero Soln Inhale 2 puffs into the lungs every 4 (four) hours as needed for Wheezing. 25.5 g 1    fluticasone-salmeterol 250-50 MCG/ACT Inhalation Aerosol Powder, Breath  Activated Inhale 1 puff into the lungs every 12 (twelve) hours. 180 each 1     Allergies:No Known Allergies   Vitals:    04/13/24 1101   BP: 127/75   Pulse: 84   Temp: 98.5 °F (36.9 °C)   TempSrc: Temporal   SpO2: 96%   Weight: 157 lb (71.2 kg)   Height: 5' 4\" (1.626 m)       Body mass index is 26.95 kg/m².   PHYSICAL EXAM:   Physical Exam  Vitals reviewed.   Constitutional:       General: She is not in acute distress.     Appearance: Normal appearance. She is well-developed.   HENT:      Head: Normocephalic and atraumatic.      Right Ear: Tympanic membrane, ear canal and external ear normal.      Left Ear: Tympanic membrane, ear canal and external ear normal.   Eyes:      Conjunctiva/sclera: Conjunctivae normal.   Neck:      Thyroid: No thyroid mass or thyroid tenderness.   Cardiovascular:      Rate and Rhythm: Normal rate and regular rhythm.      Pulses: Normal pulses.      Heart sounds: Normal heart sounds, S1 normal and S2 normal. No murmur heard.  Pulmonary:      Effort: Pulmonary effort is normal. No respiratory distress.      Breath sounds: Normal breath sounds. No wheezing, rhonchi or rales.   Abdominal:      General: Bowel sounds are normal.      Palpations: Abdomen is soft.      Tenderness: There is no abdominal tenderness. There is no guarding or rebound.   Musculoskeletal:      Cervical back: Normal range of motion and neck supple. No muscular tenderness.      Right lower leg: No edema.      Left lower leg: No edema.   Lymphadenopathy:      Cervical: No cervical adenopathy.   Skin:     General: Skin is warm and dry.      Coloration: Skin is not jaundiced.   Neurological:      General: No focal deficit present.      Mental Status: She is alert and oriented to person, place, and time. Mental status is at baseline.   Psychiatric:         Attention and Perception: Attention normal.         Mood and Affect: Mood normal.         Behavior: Behavior normal. Behavior is cooperative.         Cognition and Memory:  Cognition normal.             ASSESSMENT/PLAN:   1. Well adult exam  Discussed preventative screenings  - Pap 9/2023  - mammogram ordered  - Cscope 1/2022, 10yr recall  - will check labs as below  - encouraged to continue diet/exercise for overall wellness  - follow-up with eye doctor annually  - follow-up with dentist every 6 months  - return yearly for physicals  - annual flu shot  - tetanus booster every 10yrs  - TSH W Reflex To Free T4  - CBC, Platelet; No Differential  - Comp Metabolic Panel (14)  - Hemoglobin A1C  - Lipid Panel  - Urinalysis, Routine  - VITAMIN D, SCREEN [[id number]][Q]    2. Immunity status testing  - Rubeola(Measles)Antibodies, IGG-Immunity  - Varicella Zoster, IGG    Pt verbalized understanding and agrees with plan.    Orders Placed This Encounter   Procedures    Rubeola(Measles)Antibodies, IGG-Immunity    Varicella Zoster, IGG    TSH W Reflex To Free T4    CBC, Platelet; No Differential    Comp Metabolic Panel (14)    Hemoglobin A1C    Lipid Panel    Urinalysis, Routine    VITAMIN D, SCREEN [[id number]][Q]       Meds This Visit:  Requested Prescriptions      No prescriptions requested or ordered in this encounter       Imaging & Referrals:  None         ID#[id number]   06-Apr-2018 22:38

## 2024-04-15 RX ORDER — FLUTICASONE PROPIONATE AND SALMETEROL 250; 50 UG/1; UG/1
1 POWDER RESPIRATORY (INHALATION) EVERY 12 HOURS SCHEDULED
Qty: 180 EACH | Refills: 1 | Status: SHIPPED | OUTPATIENT
Start: 2024-04-15

## 2024-04-15 RX ORDER — ZALEPLON 5 MG/1
5 CAPSULE ORAL NIGHTLY
Refills: 0 | OUTPATIENT
Start: 2024-04-15

## 2024-04-15 RX ORDER — FLUTICASONE PROPIONATE AND SALMETEROL 250; 50 UG/1; UG/1
1 POWDER RESPIRATORY (INHALATION) EVERY 12 HOURS SCHEDULED
Qty: 3 EACH | Refills: 3 | Status: CANCELLED
Start: 2024-04-15

## 2024-04-15 RX ORDER — ALBUTEROL SULFATE 90 UG/1
2 AEROSOL, METERED RESPIRATORY (INHALATION) EVERY 4 HOURS PRN
Qty: 25.5 G | Refills: 1 | Status: SHIPPED | OUTPATIENT
Start: 2024-04-15

## 2024-04-15 RX ORDER — ZALEPLON 5 MG/1
5 CAPSULE ORAL NIGHTLY
Qty: 30 CAPSULE | Refills: 2
Start: 2024-04-15 | End: 2024-07-14

## 2024-04-15 RX ORDER — LOSARTAN POTASSIUM 50 MG/1
50 TABLET ORAL EVERY MORNING
Qty: 90 TABLET | Refills: 3 | Status: SHIPPED | OUTPATIENT
Start: 2024-04-15

## 2024-04-15 NOTE — TELEPHONE ENCOUNTER
From: Yara Westfall  To: Nan Hurtado  Sent: 4/13/2024 1:04 PM CDT  Subject: recently Physical/ Advarir and ProAir [facility]    Please add these but send it to [facility] due to better coverage. (Had recent PE but forgot refills)   1)Generic Advair 250/50 3months suppy / qty 3    2) pls do generic version ProAir (albuterol) just check may substitute = 3mos/ qty3 ( pls do not do ventolin generic inhaler cloggs easily)     If this concern seldom use Albuterol use it for exercise and place it few location in home and car.    Sorry I forgot and they will not allow me to refill through my chart.   Thank you ! for doing this   Yara

## 2024-04-15 NOTE — TELEPHONE ENCOUNTER
From: Yara Westfall  To: Nan Hurtado  Sent: 4/13/2024 12:52 PM CDT  Subject: Need refill Zaleplon and losartan to [facility]/ recent Physical    Hi Dr. Hurtado,  I had recent Physical with you since you my new provider and unable to refill my chart. I forget request refill for medications;  Please send these to [facility] [place] and [place]:  Losartan 50mg daily (90days supply) for HTN  Zaleplon 5mg when need for insomnia HS (30days supply) 3 refills

## 2024-04-15 NOTE — TELEPHONE ENCOUNTER
Refill passed per protocol.    Requested Prescriptions   Pending Prescriptions Disp Refills    albuterol (PROAIR HFA) 108 (90 Base) MCG/ACT Inhalation Aero Soln 3 each 3     Sig: Inhale 2 puffs into the lungs every 4 (four) hours as needed for Wheezing.       Asthma & COPD Medication Protocol Passed - 4/15/2024 12:27 PM        Passed - Asthma Action Score greater than or equal to 20        Passed - Appointment in past 6 or next 3 months      Recent Outpatient Visits              2 days ago Immunity status testing    [facility], Nan Schwab MD    Office Visit    1 week ago Urgency of urination    [facility] Sherry Vallejo MD    Office Visit    4 weeks ago Acute bronchitis, unspecified organism    [facility] MARIVEL Payton    Office Visit    1 month ago     [facility], Diego Luevano Jane, RN    Nurse Only    2 months ago     [facility] [place] Jane, RN    Nurse Only          Future Appointments         Provider Department Appt Notes    In 1 week Biester, Catie COX MD [facility] - OB/GYN Hormone replacement                    Passed - AAP/ACT given in last 12 months     Yes  Yes  Yes  25            fluticasone-salmeterol 250-50 MCG/ACT Inhalation Aerosol Powder, Breath Activated 3 each 0     Sig: Inhale 1 puff into the lungs every 12 (twelve) hours.       Asthma & COPD Medication Protocol Passed - 4/15/2024 12:27 PM        Passed - Asthma Action Score greater than or equal to 20        Passed - Appointment in past 6 or next 3 months      Recent Outpatient Visits              2 days ago [facility]ity status testing    [facility]    [facility]  [facility]    1 week ago Urgency of urination    [facility]    Office Visit    4 weeks ago Acute bronchitis, unspecified organism    [facility], [facility], [facility], MARIVEL Payton    Office Visit    1 month ago     [facility] [place]    Nurse Only    2 months ago     [facility], Diego Luevano Jane, RN    Nurse Only          Future Appointments         Provider Department Appt Notes    In 1 week Biester, Catie COX MD [facility] - OB/GYN Hormone replacement                    Passed - AAP/ACT given in last 12 months     Yes  Yes  Yes  25               Future Appointments         Provider Department Appt Notes    In 1 week Biester, Catie COX MD [facility] - OB/GYN Hormone replacement          Recent Outpatient Visits              2 days ago Immunity status testing    [facility], [facility] [place] Nan Hurtado MD    Office Visit    1 week ago Urgency of urination    [facility]    Office Visit    4 weeks ago Acute bronchitis, unspecified organism    [facility], [facility], [facility], MARIVEL Payton    Office Visit    1 month ago     [facility] [place]    Nurse Only    2 months ago     [facility], Diego Luevano Jane, RN    Nurse Only

## 2024-04-23 ENCOUNTER — PATIENT MESSAGE (OUTPATIENT)
Dept: FAMILY MEDICINE CLINIC | Facility: CLINIC | Age: 54
End: 2024-04-23

## 2024-04-24 ENCOUNTER — OFFICE VISIT (OUTPATIENT)
Dept: OBGYN CLINIC | Facility: CLINIC | Age: 54
End: 2024-04-24
Payer: COMMERCIAL

## 2024-04-24 ENCOUNTER — TELEPHONE (OUTPATIENT)
Dept: FAMILY MEDICINE CLINIC | Facility: CLINIC | Age: 54
End: 2024-04-24

## 2024-04-24 VITALS
HEIGHT: 64 IN | DIASTOLIC BLOOD PRESSURE: 80 MMHG | SYSTOLIC BLOOD PRESSURE: 126 MMHG | WEIGHT: 157 LBS | BODY MASS INDEX: 26.8 KG/M2

## 2024-04-24 DIAGNOSIS — Z12.31 BREAST CANCER SCREENING BY MAMMOGRAM: Primary | ICD-10-CM

## 2024-04-24 DIAGNOSIS — Z78.0 MENOPAUSE: ICD-10-CM

## 2024-04-24 PROBLEM — N95.1 MENOPAUSAL SYMPTOMS: Status: ACTIVE | Noted: 2023-09-11

## 2024-04-24 PROCEDURE — 99214 OFFICE O/P EST MOD 30 MIN: CPT | Performed by: OBSTETRICS & GYNECOLOGY

## 2024-04-24 RX ORDER — ESTRADIOL 0.05 MG/D
1 PATCH TRANSDERMAL WEEKLY
Qty: 4 EACH | Refills: 11 | Status: SHIPPED | OUTPATIENT
Start: 2024-04-24 | End: 2025-04-19

## 2024-04-24 RX ORDER — PROGESTERONE 100 MG/1
100 CAPSULE ORAL NIGHTLY
Qty: 90 CAPSULE | Refills: 3 | Status: SHIPPED | OUTPATIENT
Start: 2024-04-24 | End: 2025-04-19

## 2024-04-24 NOTE — TELEPHONE ENCOUNTER
Patient calling to state cannot see documentation for visit on 04/13/24 via [email or web address], see patient message on 04/23/24.    Patient discharged to [facility]. Left PIV remains at time of discharge. Skin remains intact. Patient sustained no injuries during this hospital stay. Patient left floor via ambulance escorted by family and EMS team. All belongings retained by family at the time of discharge. Patient was pre-medicated with IV fentanyl and Lorazepam for comfort.    Phone report called to Ragini at this time, receiving RN was unable to speak at this time and will return call.

## 2024-04-24 NOTE — PROGRESS NOTES
Yara Westfall is a 53 year old female.    HPI:     Chief Complaint   Patient presents with    Menopause     Pt c/o menopausal symptoms and would like to start HRT's     Mammogram Screening     Needs mammogram order, due 12/2024       Previous patient of Dr. Donovan here requesting initiation of HRT for worsening insomnia and body aches. Trial with aternative remedies for relief unsuccessful. HRT risk and benefits reviewed - prefers estradiol patch and daily micronized progesterone. Advised obtaining baseline DEXA assessment of bone health along with need for annual gyn exams and mammograms. Aware that breakthrough bleeding may occur on hormonal therapy and that evaluation of endometrial lining would be indicated. Questions answered and agrees with plan..    Medications (Active prior to today's visit):  Current Outpatient Medications   Medication Sig Dispense Refill    losartan 50 MG Oral Tab Take 1 tablet (50 mg total) by mouth every morning. 90 tablet 3    albuterol (PROAIR HFA) 108 (90 Base) MCG/ACT Inhalation Aero Soln Inhale 2 puffs into the lungs every 4 (four) hours as needed for Wheezing. 25.5 g 1    fluticasone-salmeterol 250-50 MCG/ACT Inhalation Aerosol Powder, Breath Activated Inhale 1 puff into the lungs every 12 (twelve) hours. 180 each 1    Mirabegron ER 50 MG Oral Tablet 24 Hr Take 1 tablet (50 mg total) by mouth daily. 90 tablet 3    Zaleplon 5 MG Oral Cap Take 1 capsule (5 mg total) by mouth nightly.      Ergocalciferol (VITAMIN D2) 50 MCG (2000 UT) Oral Tab       MAGNESIUM GLYCINATE  mg.      cetirizine 10 MG Oral Tab Take 1 tablet (10 mg total) by mouth daily as needed.      fluticasone propionate (FLONASE) 50 MCG/ACT Nasal Suspension       melatonin 3 MG Oral Tab       omega-3 fatty acids 1000 MG Oral Cap Take by mouth daily.      Calcium Carb-Cholecalciferol 600-400 MG-UNIT Oral Tab       Doxylamine Succinate, Sleep, 25 MG Oral Tab          Allergies:  No Known Allergies    BP  126/80   Ht 64\"   Wt 157 lb (71.2 kg)   LMP 04/12/2021 (Approximate)   BMI 26.95 kg/m²           ASSESSMENT/PLAN:   Assessment       1. Menopause symptoms - to begin HRT  - Estradiol weekly patch + daily micronized progesterone prescribed  - Baseline DEXA ordered  - Mammogram ordered  - Return annually for gyne exams      Total time spent = 30 minutes  >50% of visit = face to face discussion and coordination of care        4/24/2024  Catie Lui MD

## 2024-04-24 NOTE — TELEPHONE ENCOUNTER
From: Yara Westfall  To: Nan Hurtado  Sent: 4/23/2024 10:44 PM CDT  Subject: lab orders/ no charting 4-13-24?    Hi Bryant García ,  I plan to have my labs done next week or follow week. I notice there was no documentation about my well exam visit on 4-13-24 Sat. on my chart account. I just want to make sure my well visit is in the system for future refills and etc.      I am no hurry to receive order, I just want to make sure there is order for labs: cbc, cmp, lipid, HbAIC, TSH w/ reflex, and Vit D. which I plan to do [facility] next couple weeks. Is there lab order place before I go?     Thank you   Yara

## 2024-04-24 NOTE — TELEPHONE ENCOUNTER
Notified patient visit notes not completed, she will be able to see when this is done by physician. Verified orders in system patient had questions about.

## 2024-05-01 ENCOUNTER — LAB ENCOUNTER (OUTPATIENT)
Dept: LAB | Age: 54
End: 2024-05-01
Attending: STUDENT IN AN ORGANIZED HEALTH CARE EDUCATION/TRAINING PROGRAM
Payer: COMMERCIAL

## 2024-05-01 ENCOUNTER — NURSE TRIAGE (OUTPATIENT)
Dept: FAMILY MEDICINE CLINIC | Facility: CLINIC | Age: 54
End: 2024-05-01

## 2024-05-01 DIAGNOSIS — R35.0 URINARY FREQUENCY: Primary | ICD-10-CM

## 2024-05-01 LAB
ALBUMIN SERPL-MCNC: 4.5 G/DL (ref 3.2–4.8)
ALBUMIN/GLOB SERPL: 1.7 {RATIO} (ref 1–2)
ALP LIVER SERPL-CCNC: 99 U/L
ALT SERPL-CCNC: 21 U/L
ANION GAP SERPL CALC-SCNC: 3 MMOL/L (ref 0–18)
AST SERPL-CCNC: 25 U/L (ref ?–34)
BILIRUB SERPL-MCNC: 0.8 MG/DL (ref 0.3–1.2)
BILIRUB UR QL: NEGATIVE
BUN BLD-MCNC: 14 MG/DL (ref 9–23)
BUN/CREAT SERPL: 18.9 (ref 10–20)
CALCIUM BLD-MCNC: 9.3 MG/DL (ref 8.7–10.4)
CHLORIDE SERPL-SCNC: 110 MMOL/L (ref 98–112)
CHOLEST SERPL-MCNC: 188 MG/DL (ref ?–200)
CLARITY UR: CLEAR
CO2 SERPL-SCNC: 27 MMOL/L (ref 21–32)
COLOR UR: COLORLESS
CREAT BLD-MCNC: 0.74 MG/DL
DEPRECATED RDW RBC AUTO: 37.3 FL (ref 35.1–46.3)
EGFRCR SERPLBLD CKD-EPI 2021: 97 ML/MIN/1.73M2 (ref 60–?)
ERYTHROCYTE [DISTWIDTH] IN BLOOD BY AUTOMATED COUNT: 11.8 % (ref 11–15)
EST. AVERAGE GLUCOSE BLD GHB EST-MCNC: 114 MG/DL (ref 68–126)
FASTING PATIENT LIPID ANSWER: YES
FASTING STATUS PATIENT QL REPORTED: YES
GLOBULIN PLAS-MCNC: 2.7 G/DL (ref 2.8–4.4)
GLUCOSE BLD-MCNC: 102 MG/DL (ref 70–99)
GLUCOSE UR-MCNC: NORMAL MG/DL
HBA1C MFR BLD: 5.6 % (ref ?–5.7)
HCT VFR BLD AUTO: 42.4 %
HDLC SERPL-MCNC: 51 MG/DL (ref 40–59)
HGB BLD-MCNC: 14.6 G/DL
HGB UR QL STRIP.AUTO: NEGATIVE
KETONES UR-MCNC: NEGATIVE MG/DL
LDLC SERPL CALC-MCNC: 117 MG/DL (ref ?–100)
LEUKOCYTE ESTERASE UR QL STRIP.AUTO: NEGATIVE
MCH RBC QN AUTO: 30 PG (ref 26–34)
MCHC RBC AUTO-ENTMCNC: 34.4 G/DL (ref 31–37)
MCV RBC AUTO: 87.2 FL
NITRITE UR QL STRIP.AUTO: NEGATIVE
NONHDLC SERPL-MCNC: 137 MG/DL (ref ?–130)
OSMOLALITY SERPL CALC.SUM OF ELEC: 291 MOSM/KG (ref 275–295)
PH UR: 8 [PH] (ref 5–8)
PLATELET # BLD AUTO: 250 10(3)UL (ref 150–450)
POTASSIUM SERPL-SCNC: 3.8 MMOL/L (ref 3.5–5.1)
PROT SERPL-MCNC: 7.2 G/DL (ref 5.7–8.2)
PROT UR-MCNC: NEGATIVE MG/DL
RBC # BLD AUTO: 4.86 X10(6)UL
SODIUM SERPL-SCNC: 140 MMOL/L (ref 136–145)
SP GR UR STRIP: 1.01 (ref 1–1.03)
TRIGL SERPL-MCNC: 109 MG/DL (ref 30–149)
TSI SER-ACNC: 0.61 MIU/ML (ref 0.55–4.78)
UROBILINOGEN UR STRIP-ACNC: NORMAL
VIT D+METAB SERPL-MCNC: 57 NG/ML (ref 30–100)
VLDLC SERPL CALC-MCNC: 19 MG/DL (ref 0–30)
WBC # BLD AUTO: 7.6 X10(3) UL (ref 4–11)

## 2024-05-01 PROCEDURE — 85027 COMPLETE CBC AUTOMATED: CPT | Performed by: STUDENT IN AN ORGANIZED HEALTH CARE EDUCATION/TRAINING PROGRAM

## 2024-05-01 PROCEDURE — 83036 HEMOGLOBIN GLYCOSYLATED A1C: CPT | Performed by: STUDENT IN AN ORGANIZED HEALTH CARE EDUCATION/TRAINING PROGRAM

## 2024-05-01 PROCEDURE — 84443 ASSAY THYROID STIM HORMONE: CPT | Performed by: STUDENT IN AN ORGANIZED HEALTH CARE EDUCATION/TRAINING PROGRAM

## 2024-05-01 PROCEDURE — 36415 COLL VENOUS BLD VENIPUNCTURE: CPT | Performed by: STUDENT IN AN ORGANIZED HEALTH CARE EDUCATION/TRAINING PROGRAM

## 2024-05-01 PROCEDURE — 86765 RUBEOLA ANTIBODY: CPT | Performed by: STUDENT IN AN ORGANIZED HEALTH CARE EDUCATION/TRAINING PROGRAM

## 2024-05-01 PROCEDURE — 80061 LIPID PANEL: CPT | Performed by: STUDENT IN AN ORGANIZED HEALTH CARE EDUCATION/TRAINING PROGRAM

## 2024-05-01 PROCEDURE — 82306 VITAMIN D 25 HYDROXY: CPT | Performed by: STUDENT IN AN ORGANIZED HEALTH CARE EDUCATION/TRAINING PROGRAM

## 2024-05-01 PROCEDURE — 81003 URINALYSIS AUTO W/O SCOPE: CPT | Performed by: STUDENT IN AN ORGANIZED HEALTH CARE EDUCATION/TRAINING PROGRAM

## 2024-05-01 PROCEDURE — 86787 VARICELLA-ZOSTER ANTIBODY: CPT | Performed by: STUDENT IN AN ORGANIZED HEALTH CARE EDUCATION/TRAINING PROGRAM

## 2024-05-01 PROCEDURE — 80053 COMPREHEN METABOLIC PANEL: CPT | Performed by: STUDENT IN AN ORGANIZED HEALTH CARE EDUCATION/TRAINING PROGRAM

## 2024-05-01 NOTE — TELEPHONE ENCOUNTER
Action Requested: Summary for Provider     []  Critical Lab, Recommendations Needed  [x] Need Additional Advice  []   FYI    []   Need Orders  [] Need Medications Sent to Pharmacy  []  Other     SUMMARY:   Spoke with pt,  verified, pt c/o freq and urgentcy at [facility] for 2 days and last night was the worse.   Pt on Mirabegron 50 mg every day  She is wondering if we can add urine with reflex or add urine culture from the existing lab order that was placed  on 24.  Pt declined appt, she is just requesting for Rx refill and lab order if possible.   Pt was advised if sx persist or gets worse to go to ER/ IC, she agreed and stated understanding.   Urine lab order pended  pls advise, thanks in advance.         Reason for call: UTI sx   Onset: 2 days    Reason for Disposition   Urinating more frequently than usual (i.e., frequency)    Protocols used: Urinary Symptoms-A-OH    Future Appointments   Date Time Provider Department Center   2024 11:20 AM HND DEXA 1 [facility] DEXA [facility] [place]   2024  8:45 AM Nan Hurtado MD [facility]

## 2024-05-01 NOTE — TELEPHONE ENCOUNTER
Pt completed labs earlier today through [employer], can you please contact [employer] to see if Ucx can be added to submitted sample? Otherwise Ucx ordered

## 2024-05-01 NOTE — TELEPHONE ENCOUNTER
Dr. Hurtado  Patient completed labs at [facility] lab. Spoke to Michela with the lab, she checked to see if the urine cup was still at the lab or received and it was not. As the order was not for with reflex culture, no culture tube collected. Would you like patient contacted to leave another specimen? Please advise, thank you.

## 2024-05-01 NOTE — TELEPHONE ENCOUNTER
Will await urine results, if abnormal will plan to send empiric abx.  No need for another specimen at this time.

## 2024-05-01 NOTE — TELEPHONE ENCOUNTER
Patient received GILUPI message. She is still having urgency and iritation when she urinates. She is requesting Azo be sent to [place].     [id number] - [place], [place] - [address] [phone], [phone]   [address] [place] [place] [address]   Phone: [phone] Fax: [phone]   Hours: Open 24 hours           Your urine testing shows no signs of blood or infection. I will send Azo to your pharmacy, but if symptoms persist/worsen, I recommend providing another urine sample for culture testing. Please contact us with any questions.  Your electrolytes, kidney function, liver function, and thyroid function are normal.  You do not have diabetes. No sign of infection or anemia.  Your lipid panel is mildly abnormal with elevated LDL; I recommend working on diet and exercise changes for overall wellness. This includes decreased carb and sugar intake, increased fiber intake, and increased water intake as tolerated, as well as regular exercise.  Please call with any questions.   Written by Nan Hurtado MD on 5/1/2024  3:03 PM CDT  Seen by patient Yara Westfall on 5/1/2024  3:55 PM

## 2024-05-02 ENCOUNTER — LAB ENCOUNTER (OUTPATIENT)
Dept: LAB | Age: 54
End: 2024-05-02
Attending: STUDENT IN AN ORGANIZED HEALTH CARE EDUCATION/TRAINING PROGRAM
Payer: COMMERCIAL

## 2024-05-02 PROCEDURE — 87086 URINE CULTURE/COLONY COUNT: CPT | Performed by: STUDENT IN AN ORGANIZED HEALTH CARE EDUCATION/TRAINING PROGRAM

## 2024-05-03 LAB
MEV IGG SER-ACNC: >300 AU/ML (ref 16.5–?)
VZV IGG SER IA-ACNC: 3611 (ref 165–?)

## 2024-05-10 ENCOUNTER — OFFICE VISIT (OUTPATIENT)
Dept: SURGERY | Facility: CLINIC | Age: 54
End: 2024-05-10
Payer: COMMERCIAL

## 2024-05-10 VITALS — HEIGHT: 64 IN | WEIGHT: 157 LBS | BODY MASS INDEX: 26.8 KG/M2

## 2024-05-10 DIAGNOSIS — R39.9 URINARY SYMPTOM OR SIGN: Primary | ICD-10-CM

## 2024-05-10 LAB
APPEARANCE: CLEAR
BILIRUBIN: NEGATIVE
GLUCOSE (URINE DIPSTICK): NEGATIVE MG/DL
KETONES (URINE DIPSTICK): NEGATIVE MG/DL
LEUKOCYTES: NEGATIVE
MULTISTIX LOT#: ABNORMAL NUMERIC
NITRITE, URINE: NEGATIVE
PH, URINE: 8 (ref 4.5–8)
PROTEIN (URINE DIPSTICK): NEGATIVE MG/DL
SPECIFIC GRAVITY: 1.02 (ref 1–1.03)
URINE-COLOR: YELLOW
UROBILINOGEN,SEMI-QN: 0.2 MG/DL (ref 0–1.9)

## 2024-05-10 PROCEDURE — 87086 URINE CULTURE/COLONY COUNT: CPT | Performed by: OBSTETRICS & GYNECOLOGY

## 2024-05-10 PROCEDURE — 81002 URINALYSIS NONAUTO W/O SCOPE: CPT | Performed by: OBSTETRICS & GYNECOLOGY

## 2024-05-10 PROCEDURE — 99213 OFFICE O/P EST LOW 20 MIN: CPT | Performed by: OBSTETRICS & GYNECOLOGY

## 2024-05-10 RX ORDER — ESCITALOPRAM OXALATE 10 MG/1
10 TABLET ORAL DAILY
COMMUNITY
Start: 2024-04-29 | End: 2024-05-11

## 2024-05-10 RX ORDER — ESTRADIOL 0.1 MG/G
CREAM VAGINAL
Qty: 42.5 G | Refills: 5 | Status: SHIPPED | OUTPATIENT
Start: 2024-05-10

## 2024-05-10 NOTE — PROGRESS NOTES
Yara Westfall is a 53 year old female.    HPI:     Chief Complaint   Patient presents with    Urinary Symptoms     Pt c/o urinary symptoms on Progesterone     Vaginal Bleeding     Pt c/o vaginal spotting 05/08 & 05/09       Wants to discontinue oral HRT due to bladder symptoms when taking progesterone. Counseled that spotting is normal as hormonal levels readjust. Interested in topical ERT cream for urogenital relief. Instructed on uses nightly x 14 days hen twice weekly thereafter.    Medications (Active prior to today's visit):  Current Outpatient Medications   Medication Sig Dispense Refill    escitalopram 10 MG Oral Tab Take 1 tablet (10 mg total) by mouth daily.      losartan 50 MG Oral Tab Take 1 tablet (50 mg total) by mouth every morning. 90 tablet 3    albuterol (PROAIR HFA) 108 (90 Base) MCG/ACT Inhalation Aero Soln Inhale 2 puffs into the lungs every 4 (four) hours as needed for Wheezing. 25.5 g 1    fluticasone-salmeterol 250-50 MCG/ACT Inhalation Aerosol Powder, Breath Activated Inhale 1 puff into the lungs every 12 (twelve) hours. 180 each 1    Mirabegron ER 50 MG Oral Tablet 24 Hr Take 1 tablet (50 mg total) by mouth daily. 90 tablet 3    Zaleplon 5 MG Oral Cap Take 1 capsule (5 mg total) by mouth nightly.      Ergocalciferol (VITAMIN D2) 50 MCG (2000 UT) Oral Tab       MAGNESIUM GLYCINATE  mg.      cetirizine 10 MG Oral Tab Take 1 tablet (10 mg total) by mouth daily as needed.      fluticasone propionate (FLONASE) 50 MCG/ACT Nasal Suspension       melatonin 3 MG Oral Tab       omega-3 fatty acids 1000 MG Oral Cap Take by mouth daily.      Calcium Carb-Cholecalciferol 600-400 MG-UNIT Oral Tab       Doxylamine Succinate, Sleep, 25 MG Oral Tab          Allergies:  No Known Allergies    Ht 64\"   Wt 157 lb (71.2 kg)   LMP 04/12/2021 (Approximate)   BMI 26.95 kg/m²        ASSESSMENT/PLAN:   Assessment       1. Urinary symptom or sign  - Urine Dip in office [26923]  - Urine Culture, Routine;  Future  - Urine Culture, Routine  - Stop systemic HRT  - Start vaginal estradiol cream      Total time spent = 15 minutes  >50% of visit = face to face discussion and coordination of care        5/10/2024  Catie Lui MD

## 2024-05-11 ENCOUNTER — OFFICE VISIT (OUTPATIENT)
Dept: FAMILY MEDICINE CLINIC | Facility: CLINIC | Age: 54
End: 2024-05-11
Payer: COMMERCIAL

## 2024-05-11 VITALS
WEIGHT: 142.19 LBS | BODY MASS INDEX: 24 KG/M2 | OXYGEN SATURATION: 98 % | SYSTOLIC BLOOD PRESSURE: 132 MMHG | TEMPERATURE: 98 F | RESPIRATION RATE: 16 BRPM | DIASTOLIC BLOOD PRESSURE: 86 MMHG | HEART RATE: 70 BPM

## 2024-05-11 DIAGNOSIS — G47.00 INSOMNIA, UNSPECIFIED TYPE: Primary | ICD-10-CM

## 2024-05-11 DIAGNOSIS — F41.9 ANXIETY: ICD-10-CM

## 2024-05-11 PROCEDURE — 99214 OFFICE O/P EST MOD 30 MIN: CPT | Performed by: STUDENT IN AN ORGANIZED HEALTH CARE EDUCATION/TRAINING PROGRAM

## 2024-05-11 RX ORDER — ZALEPLON 5 MG/1
5 CAPSULE ORAL NIGHTLY
Qty: 90 CAPSULE | Refills: 1 | Status: SHIPPED | OUTPATIENT
Start: 2024-05-11

## 2024-05-11 RX ORDER — VENLAFAXINE HYDROCHLORIDE 75 MG/1
75 CAPSULE, EXTENDED RELEASE ORAL DAILY
Qty: 30 CAPSULE | Refills: 3 | Status: SHIPPED | OUTPATIENT
Start: 2024-05-11

## 2024-05-11 NOTE — PROGRESS NOTES
HPI:    Patient ID: Yara Westfall is a 53 year old female.    HPI  Pt presenting for follow-up.    H/o insomnia  Admits to obsessing with inability to sleep  Has been taking Sonata PRN with improvement  Trying to alternate with Benadryl, melatonin    H/o anxiety  Hesitant to start Lexapro dosing initially  Still reports ongoing symptoms  Notes prior Zoloft with nausea  Denies SH/SI/HI  Recently seen by OBG who ordered HRT but has not started  Interested in CBT therapy    Review of Systems   A comprehensive 10 point review of systems was completed.  Pertinent positives and negatives noted in the the HPI.       Current Outpatient Medications   Medication Sig Dispense Refill    Zaleplon 5 MG Oral Cap Take 1 capsule (5 mg total) by mouth nightly. 90 capsule 1    venlafaxine ER (EFFEXOR XR) 75 MG Oral Capsule SR 24 Hr Take 1 capsule (75 mg total) by mouth daily. 30 capsule 3    losartan 50 MG Oral Tab Take 1 tablet (50 mg total) by mouth every morning. 90 tablet 3    albuterol (PROAIR HFA) 108 (90 Base) MCG/ACT Inhalation Aero Soln Inhale 2 puffs into the lungs every 4 (four) hours as needed for Wheezing. 25.5 g 1    fluticasone-salmeterol 250-50 MCG/ACT Inhalation Aerosol Powder, Breath Activated Inhale 1 puff into the lungs every 12 (twelve) hours. 180 each 1    Mirabegron ER 50 MG Oral Tablet 24 Hr Take 1 tablet (50 mg total) by mouth daily. 90 tablet 3    Ergocalciferol (VITAMIN D2) 50 MCG (2000 UT) Oral Tab       MAGNESIUM GLYCINATE  mg.      cetirizine 10 MG Oral Tab Take 1 tablet (10 mg total) by mouth daily as needed.      fluticasone propionate (FLONASE) 50 MCG/ACT Nasal Suspension       melatonin 3 MG Oral Tab       omega-3 fatty acids 1000 MG Oral Cap Take by mouth daily.      Calcium Carb-Cholecalciferol 600-400 MG-UNIT Oral Tab       Doxylamine Succinate, Sleep, 25 MG Oral Tab       estradiol 0.1 MG/GM Vaginal Cream Place 1 g vaginally nightly x 2 weeks then twice weekly thereafter (Patient not  taking: Reported on 5/11/2024) 42.5 g 5     Allergies:No Known Allergies   Vitals:    05/11/24 0846   BP: 132/86   Pulse: 70   Resp: 16   Temp: 98 °F (36.7 °C)   TempSrc: Oral   SpO2: 98%   Weight: 142 lb 3.2 oz (64.5 kg)       Body mass index is 24.41 kg/m².   PHYSICAL EXAM:   Physical Exam  Vitals reviewed.   Constitutional:       General: She is not in acute distress.  HENT:      Head: Normocephalic.   Eyes:      Conjunctiva/sclera: Conjunctivae normal.   Cardiovascular:      Pulses: Normal pulses.   Pulmonary:      Effort: Pulmonary effort is normal. No respiratory distress.   Musculoskeletal:      Cervical back: Normal range of motion.   Neurological:      General: No focal deficit present.      Mental Status: She is alert and oriented to person, place, and time. Mental status is at baseline.   Psychiatric:         Mood and Affect: Mood normal.         Behavior: Behavior normal.             ASSESSMENT/PLAN:   1. Insomnia, unspecified type  Stable, refilled  Encouraged sleep hygiene  Discussed role of counseling  - discussed red flags for urgent reevaluation  - Zaleplon 5 MG Oral Cap; Take 1 capsule (5 mg total) by mouth nightly.  Dispense: 90 capsule; Refill: 1  - OP REFERRAL TO [facility]    2. Anxiety  Discussed perimenopausal component  - will trial Effexor dosing   -  referral placed   - to call with questions/concerns  - advised to proceed to ED if feeling unsafe at home  - venlafaxine ER (EFFEXOR XR) 75 MG Oral Capsule SR 24 Hr; Take 1 capsule (75 mg total) by mouth daily.  Dispense: 30 capsule; Refill: 3  - OP REFERRAL TO [facility]    Follow-up in 1 month for surveillance. Pt verbalized understanding and agrees with plan.    No orders of the defined types were placed in this encounter.      Meds This Visit:  Requested Prescriptions     Signed Prescriptions Disp Refills    Zaleplon 5 MG Oral Cap 90 capsule 1     Sig: Take 1 capsule (5 mg total) by mouth nightly.    venlafaxine ER (EFFEXOR XR) 75 MG Oral  Capsule SR 24 Hr 30 capsule 3     Sig: Take 1 capsule (75 mg total) by mouth daily.       Imaging & Referrals:  OP REFERRAL TO [facility]         ID#[id number]

## 2024-05-23 ENCOUNTER — TELEPHONE (OUTPATIENT)
Dept: OBGYN CLINIC | Facility: CLINIC | Age: 54
End: 2024-05-23

## 2024-06-10 ENCOUNTER — HOSPITAL ENCOUNTER (OUTPATIENT)
Age: 54
Discharge: HOME | End: 2024-06-10
Payer: COMMERCIAL

## 2024-06-10 VITALS
SYSTOLIC BLOOD PRESSURE: 134 MMHG | OXYGEN SATURATION: 98 % | TEMPERATURE: 98 F | DIASTOLIC BLOOD PRESSURE: 95 MMHG | RESPIRATION RATE: 14 BRPM | HEART RATE: 99 BPM

## 2024-06-10 DIAGNOSIS — R35.0 URINARY FREQUENCY: Primary | ICD-10-CM

## 2024-06-10 DIAGNOSIS — R03.0 ELEVATED BLOOD PRESSURE READING: ICD-10-CM

## 2024-06-10 LAB
BILIRUB UR QL STRIP: NEGATIVE
CLARITY UR: CLEAR
COLOR UR: YELLOW
GLUCOSE UR STRIP-MCNC: NEGATIVE MG/DL
KETONES UR STRIP-MCNC: NEGATIVE MG/DL
LEUKOCYTE ESTERASE UR QL STRIP: NEGATIVE
NITRITE UR QL STRIP: NEGATIVE
PH UR STRIP: 6 [PH]
PROT UR STRIP-MCNC: NEGATIVE MG/DL
SP GR UR STRIP: 1.02
UROBILINOGEN UR STRIP-ACNC: <2 MG/DL

## 2024-06-10 PROCEDURE — 99213 OFFICE O/P EST LOW 20 MIN: CPT | Performed by: NURSE PRACTITIONER

## 2024-06-10 PROCEDURE — 81002 URINALYSIS NONAUTO W/O SCOPE: CPT | Performed by: NURSE PRACTITIONER

## 2024-06-10 RX ORDER — ESCITALOPRAM OXALATE 10 MG/1
10 TABLET ORAL DAILY
COMMUNITY

## 2024-06-10 NOTE — ED INITIAL ASSESSMENT (HPI)
Patient states she was started on estrogen almost two weeks ago and is now having some blood in her urine and frequency in urination.

## 2024-06-11 ENCOUNTER — PATIENT MESSAGE (OUTPATIENT)
Dept: SURGERY | Facility: CLINIC | Age: 54
End: 2024-06-11

## 2024-06-11 NOTE — ED PROVIDER NOTES
Patient Seen in: [facility]    History   CC: urinary frequency  HPI: Yara Westfall 54 year old female w/ HTN who presents c/o mild burning dysuria and urinary frequency in the last couple days. 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 Denies gross hematuria. Denies abd pain, n/v/d/c, back/flank pain, fever. Began using loading dose of vaginal estrogen suppositories 2 wks ago, otherwise denies dx. Denies itching.    Past Medical History:    Allergic rhinitis    Seldom / more occurence when rains    Amenorrhea    had not had [id number]    Asthma (HCC)    Essential hypertension    Dx late ’s    Sleep apnea    not compliant with cpap since . doesnt tolerate cpap       Past Surgical History:   Procedure Laterality Date    Colonoscopy  2022    D & c      2 miscarriages      O4-18-03    Also 2/15/2007 another childbirth       Family History   Problem Relation Age of Onset    Hypertension Mother     Lipids Mother     Asthma Father     Hypertension Father     Lipids Father     Other (Ovarian tumor) Sister 37        s/p resection, but still being monitored for cancer q 6months    Diabetes Maternal Grandmother     Dementia Maternal Grandfather     Asthma Paternal Grandmother     Cancer Paternal Grandfather         do not  know name. He  young.    Breast Cancer Neg     Colon Cancer Neg     Uterine Cancer Neg        Social History     Socioeconomic History    Marital status:    Occupational History     Employer: [employer]    Occupation: Nurse   Tobacco Use    Smoking status: Never    Smokeless tobacco: Never   Vaping Use    Vaping status: Never Used   Substance and Sexual Activity    Alcohol use: Not Currently     Comment: rarely drink .  Only special occasions.    Drug use: Never    Sexual activity: Yes     Partners: Male   Other Topics Concern    Caffeine Concern No    Exercise No    Seat Belt No    Special Diet No     Stress Concern Yes    Weight Concern Yes     Comment: Hard to lose weight as get older    Blood Transfusions No     Social Determinants of Health     Financial Resource Strain: Low Risk  (10/22/2020)    [facility]    Overall Financial Resource Strain (CARDIA)     Difficulty of Paying Living Expenses: Not very hard   Food Insecurity: No Food Insecurity (10/22/2020)    [facility]    Hunger Vital Sign     Worried About Running Out of Food in the Last Year: Never true     Ran Out of Food in the Last Year: Never true   Transportation Needs: No Transportation Needs (10/22/2020)    [facility]    PRAPARE - Transportation     Lack of Transportation (Medical): No     Lack of Transportation (Non-Medical): No       ROS:  Review of Systems    Positive for stated complaint: urinary symptoms  Other systems are as noted in HPI.  Constitutional and vital signs reviewed.      All other systems reviewed and negative except as noted above.    PSFH elements reviewed from today and agreed except as otherwise stated in HPI.             Constitutional and vital signs reviewed.        Physical Exam     ED Triage Vitals [06/10/24 1859]   BP (!) 134/95   Pulse 99   Resp 14   Temp 98.4 °F (36.9 °C)   Temp src Temporal   SpO2 98 %   O2 Device None (Room air)       Current:BP (!) 134/95   Pulse 99   Temp 98.4 °F (36.9 °C) (Temporal)   Resp 14   LMP 04/12/2021 (Approximate)   SpO2 98%         PE:  General - Appears well, non-toxic and in NAD  Head - Appears symmetrical without deformity/swelling cranium, scalp, or facial bones  GI - Appears round and flat, BS +x4 quadrants, no tenderness/guarding with palpation   - no CVA tenderness.   Skin - no rashes or petechiae noted, pink warm and dry throughout, mmm, cap refill <2seconds  Neuro  - A&O x4, steady gait  MSK - makes purposeful movements of all extremities.  Psych - Interactive and appropriate      ED Course     Labs Reviewed   [facility] POCT URINALYSIS DIPSTICK - Abnormal; Notable for the following components:       Result Value    Blood, Urine Small (*)     All other components within normal limits   URINE CULTURE, ROUTINE       MDM     DDx: cystitis, pyelonephritis, vaginitis     Urine dip with small blood.  No nitrates, leukocyte esterase or protein.  Dedicated urine culture pending.  Chart review however does show patient was placed on progesterone and had urinary symptoms associated last month.  Was discontinued and estrogen cream provided.  Urine cultures performed on both 5/11 and 5/3 were negative.  We will hold empiric antibiotics at this time pending urine culture however advised hydration instructions and close follow-up with GYN office.  Strict return/ED precautions reviewed. Patient is historian and demonstrates understanding of all instruction and agrees with plan of care.    Patient's blood pressure elevated at immediate care.  Nonsymptomatic of high blood pressure.  Red flags reviewed with patient as well as close follow-up instructions.  Pt demonstrates understanding of instruction and agrees with plan of care.    Disposition and Plan     Clinical Impression:  1. Urinary frequency        Disposition:  Discharge    Follow-up:  Catie Lui MD  [address] [place] [id number] [phone]    Go in 2 days        Medications Prescribed:  Current Discharge Medication List

## 2024-06-11 NOTE — DISCHARGE INSTRUCTIONS
Make sure to stay well hydrated with clear fluids. Follow up with your primary care provider or gynecologist within the next 2 days. Seek additional care in the ER for new or worsening symptoms, fever, abdominal pain, back pain, blood in your urine, or persistent vomiting/diarrhea.

## 2024-07-29 ENCOUNTER — OFFICE VISIT (OUTPATIENT)
Dept: SURGERY | Facility: CLINIC | Age: 54
End: 2024-07-29
Payer: COMMERCIAL

## 2024-07-29 VITALS
HEIGHT: 64 IN | SYSTOLIC BLOOD PRESSURE: 138 MMHG | BODY MASS INDEX: 27.28 KG/M2 | WEIGHT: 159.81 LBS | DIASTOLIC BLOOD PRESSURE: 82 MMHG

## 2024-07-29 DIAGNOSIS — N95.1 MENOPAUSAL VAGINAL DRYNESS: ICD-10-CM

## 2024-07-29 DIAGNOSIS — Z01.419 WOMEN'S ANNUAL ROUTINE GYNECOLOGICAL EXAMINATION: Primary | ICD-10-CM

## 2024-07-29 RX ORDER — ESTRADIOL 0.1 MG/G
CREAM VAGINAL
Qty: 3 EACH | Refills: 3 | Status: SHIPPED | OUTPATIENT
Start: 2024-07-29

## 2024-07-29 NOTE — PROGRESS NOTES
GYN ANNUAL    2024  9:20 AM    Chief Complaint   Patient presents with    Annual     Annual exam    Refill Request     Needs 3 month supple of estradiol 0.1 MG/GM Vaginal Cream   .    HPI: Patient is a 54 year old  LMP  presents for annual gyn exam and refill on vaginal estradiol cream. Vaginal dryness much improved - doing well with no other gynecologic concerns. No pelvic pain. No abnormal vaginal discharge or bleeding.     OB History    Para Term  AB Living   4 2 2   2 2   SAB IAB Ectopic Multiple Live Births   2       2      # Outcome Date GA Lbr Oniel/2nd Weight Sex Type Anes PTL Lv   4 Term 02/15/07    F NORMAL SPONT   TOÑO   3 SAB               Birth Comments: spab with D&C done   2 SAB               Birth Comments: spab with D&C done   1 Term 03    M NORMAL SPONT   TOÑO         GYN hx:    Hx Prior Abnormal Pap: No  Pap Date: 23  Pap Result Notes: Neg. Hpv-  Follow Up Recommendation: last amie done 23 WNL, appt scheulded 2024  CONTRACEPTION: N/A  LAST MAMMOGRAM: Scheduled for       Current Outpatient Medications   Medication Sig Dispense Refill    escitalopram 10 MG Oral Tab Take 1 tablet (10 mg total) by mouth daily.      estradiol 0.1 MG/GM Vaginal Cream Place 1 g vaginally nightly x 2 weeks then twice weekly thereafter 42.5 g 5    losartan 50 MG Oral Tab Take 1 tablet (50 mg total) by mouth every morning. 90 tablet 3    albuterol (PROAIR HFA) 108 (90 Base) MCG/ACT Inhalation Aero Soln Inhale 2 puffs into the lungs every 4 (four) hours as needed for Wheezing. 25.5 g 1    fluticasone-salmeterol 250-50 MCG/ACT Inhalation Aerosol Powder, Breath Activated Inhale 1 puff into the lungs every 12 (twelve) hours. 180 each 1    Mirabegron ER 50 MG Oral Tablet 24 Hr Take 1 tablet (50 mg total) by mouth daily. 90 tablet 3    Ergocalciferol (VITAMIN D2) 50 MCG ( UT) Oral Tab       MAGNESIUM GLYCINATE  mg.      cetirizine 10 MG Oral Tab Take 1 tablet  (10 mg total) by mouth daily as needed.      fluticasone propionate (FLONASE) 50 MCG/ACT Nasal Suspension       melatonin 3 MG Oral Tab       omega-3 fatty acids 1000 MG Oral Cap Take by mouth daily.      Calcium Carb-Cholecalciferol 600-400 MG-UNIT Oral Tab       Zaleplon 5 MG Oral Cap Take 1 capsule (5 mg total) by mouth nightly. (Patient not taking: Reported on 2024) 90 capsule 1    venlafaxine ER (EFFEXOR XR) 75 MG Oral Capsule SR 24 Hr Take 1 capsule (75 mg total) by mouth daily. (Patient not taking: Reported on 6/10/2024) 30 capsule 3    Doxylamine Succinate, Sleep, 25 MG Oral Tab  (Patient not taking: Reported on 6/10/2024)         Past Medical History:    Allergic rhinitis    Seldom / more occurence when rains    Amenorrhea    had not had [id number]    Asthma (HCC)    Essential hypertension    Dx late 30’s    Sleep apnea    not compliant with cpap since . doesnt tolerate cpap     Past Surgical History:   Procedure Laterality Date    Colonoscopy  2022    D & c      2 miscarriages      O4-18-03    Also 2/15/2007 another childbirth     No Known Allergies  Family History   Problem Relation Age of Onset    Hypertension Mother     Lipids Mother     Asthma Father     Hypertension Father     Lipids Father     Other (Ovarian tumor) Sister 37        s/p resection, but still being monitored for cancer q 6months    Diabetes Maternal Grandmother     Dementia Maternal Grandfather     Asthma Paternal Grandmother     Cancer Paternal Grandfather         do not  know name. He  young.    Breast Cancer Neg     Colon Cancer Neg     Uterine Cancer Neg      Social History     Socioeconomic History    Marital status:    Occupational History     Employer: [employer]    Occupation: Nurse   Tobacco Use    Smoking status: Never    Smokeless tobacco: Never   Vaping Use    Vaping status: Never Used   Substance and Sexual Activity    Alcohol use: Not Currently     Comment: rarely drink .  Only  special occasions.    Drug use: Never    Sexual activity: Yes     Partners: Male     Social History     Social History Narrative    Not on file       ROS:     Review of Systems:  A comprehensive 10 point ROS was completed. All pertinent positives and negatives noted in the HPI        /82   Ht 64\"   Wt 159 lb 12.8 oz (72.5 kg)   LMP 04/12/2021 (Approximate)   BMI 27.43 kg/m²     Exam:   GENERAL: well developed, well nourished, in no apparent distress  SKIN: no rashes, no lesions  HEENT: normal  LUNGS: respiration unlabored  CARDIOVASCULAR: no peripheral edema or varicosities, skin warm and dry  BREASTS: bilaterally nontender, no palpable masses, no nipple discharge, no skin changes, no axillary adenopathy  ABDOMEN: Soft, non distended; non tender, no masses  GYNE/:   External Genitalia: normal, no lesions, good perineal support  Urethra: meatus normal  Bladder: well supported  Vagina: normal mucosa, no lesions, no discharge   Uterus: normal size, mobile, nontender  Cervix:  normal os, no lesions or bleeding  Adnexa: normal size, bilaterally nontender, no palpable masses  Cul-de-sac: normal  R/V: normal perineum, no hemorrhoids  EXTREMITIES: nontender without edema      A/P: Patient is 54 year old female here for well-woman exam.     1. Women's annual routine gynecological examination  - Normal findings    2. Menopausal vaginal dryness  - Refill estradiol cream        7/29/2024  Catie Lui MD

## 2024-08-21 ENCOUNTER — PATIENT MESSAGE (OUTPATIENT)
Dept: FAMILY MEDICINE CLINIC | Facility: CLINIC | Age: 54
End: 2024-08-21

## 2024-08-21 DIAGNOSIS — F41.9 ANXIETY: ICD-10-CM

## 2024-08-22 RX ORDER — VENLAFAXINE HYDROCHLORIDE 75 MG/1
75 CAPSULE, EXTENDED RELEASE ORAL DAILY
Qty: 90 CAPSULE | Refills: 1 | Status: SHIPPED | OUTPATIENT
Start: 2024-08-22

## 2024-08-22 NOTE — TELEPHONE ENCOUNTER
Dr. Hurtado, pt saw you back in May. Your note said you want her to f/u in 1 month and she didn't and now needs refill. Please advise if ok to refill or if you want her to make an appointment. If you want an appointment, is video ok or must be office?    2. Anxiety  Discussed perimenopausal component  - will trial Effexor dosing   -  referral placed   - to call with questions/concerns  - advised to proceed to ED if feeling unsafe at home  - venlafaxine ER (EFFEXOR XR) 75 MG Oral Capsule SR 24 Hr; Take 1 capsule (75 mg total) by mouth daily.  Dispense: 30 capsule; Refill: 3  - OP REFERRAL TO [facility]     Follow-up in 1 month for surveillance. Pt verbalized understanding and agrees with plan.

## 2024-08-28 ENCOUNTER — OFFICE VISIT (OUTPATIENT)
Dept: INTERNAL MEDICINE CLINIC | Facility: CLINIC | Age: 54
End: 2024-08-28
Payer: COMMERCIAL

## 2024-08-28 ENCOUNTER — PATIENT MESSAGE (OUTPATIENT)
Dept: FAMILY MEDICINE CLINIC | Facility: CLINIC | Age: 54
End: 2024-08-28

## 2024-08-28 VITALS
SYSTOLIC BLOOD PRESSURE: 104 MMHG | DIASTOLIC BLOOD PRESSURE: 67 MMHG | OXYGEN SATURATION: 98 % | HEART RATE: 79 BPM | BODY MASS INDEX: 27.31 KG/M2 | HEIGHT: 64 IN | WEIGHT: 160 LBS | TEMPERATURE: 98 F

## 2024-08-28 DIAGNOSIS — K59.00 CONSTIPATION, UNSPECIFIED CONSTIPATION TYPE: Primary | ICD-10-CM

## 2024-08-28 PROCEDURE — 99214 OFFICE O/P EST MOD 30 MIN: CPT | Performed by: INTERNAL MEDICINE

## 2024-08-28 NOTE — PROGRESS NOTES
Subjective:     Patient ID: Yara Westfall is a 54 year old female.    Constipation        History/Other:   She came in today complaint of constipation according to the patient since she started taking Effexor she noticed that she is getting constipated.  She cannot sleep at night because she always feels like she has to use the bathroom the stools are hard.  She is wondering if she can go back on Lexapro.  Yesterday she tried MiraLAX and helped her        Review of Systems   Constitutional: Negative.    HENT: Negative.     Eyes: Negative.    Respiratory: Negative.     Cardiovascular: Negative.    Gastrointestinal:  Positive for constipation.   Endocrine: Negative.    Genitourinary: Negative.    Musculoskeletal: Negative.    Allergic/Immunologic: Negative.    Neurological: Negative.    Hematological: Negative.    Psychiatric/Behavioral: Negative.       Current Outpatient Medications   Medication Sig Dispense Refill    venlafaxine ER (EFFEXOR XR) 75 MG Oral Capsule SR 24 Hr Take 1 capsule (75 mg total) by mouth daily. 90 capsule 1    estradiol 0.1 MG/GM Vaginal Cream Place 1 g vaginally nightly x 2 weeks then twice weekly thereafter 3 each 3    losartan 50 MG Oral Tab Take 1 tablet (50 mg total) by mouth every morning. 90 tablet 3    albuterol (PROAIR HFA) 108 (90 Base) MCG/ACT Inhalation Aero Soln Inhale 2 puffs into the lungs every 4 (four) hours as needed for Wheezing. 25.5 g 1    fluticasone-salmeterol 250-50 MCG/ACT Inhalation Aerosol Powder, Breath Activated Inhale 1 puff into the lungs every 12 (twelve) hours. 180 each 1    Mirabegron ER 50 MG Oral Tablet 24 Hr Take 1 tablet (50 mg total) by mouth daily. 90 tablet 3    Ergocalciferol (VITAMIN D2) 50 MCG (2000 UT) Oral Tab       MAGNESIUM GLYCINATE  mg.      cetirizine 10 MG Oral Tab Take 1 tablet (10 mg total) by mouth daily as needed.      melatonin 3 MG Oral Tab       omega-3 fatty acids 1000 MG Oral Cap Take by mouth daily.      Zaleplon 5 MG  Oral Cap Take 1 capsule (5 mg total) by mouth nightly. (Patient not taking: Reported on 2024) 90 capsule 1    fluticasone propionate (FLONASE) 50 MCG/ACT Nasal Suspension  (Patient not taking: Reported on 2024)      Calcium Carb-Cholecalciferol 600-400 MG-UNIT Oral Tab  (Patient not taking: Reported on 2024)      Doxylamine Succinate, Sleep, 25 MG Oral Tab  (Patient not taking: Reported on 6/10/2024)       Allergies:No Known Allergies    Past Medical History:    Allergic rhinitis    Seldom / more occurence when rains    Amenorrhea    had not had [id number]    Asthma (HCC)    Essential hypertension    Dx late ’s    Sleep apnea    not compliant with cpap since . doesnt tolerate cpap      Past Surgical History:   Procedure Laterality Date    Colonoscopy  2022    D & c      2 miscarriages      O4-18-03    Also 2/15/2007 another childbirth      Family History   Problem Relation Age of Onset    Hypertension Mother     Lipids Mother     Asthma Father     Hypertension Father     Lipids Father     Other (Ovarian tumor) Sister 37        s/p resection, but still being monitored for cancer q 6months    Diabetes Maternal Grandmother     Dementia Maternal Grandfather     Asthma Paternal Grandmother     Cancer Paternal Grandfather         do not  know name. He  young.    Breast Cancer Neg     Colon Cancer Neg     Uterine Cancer Neg       Social History:   Social History     Socioeconomic History    Marital status:    Occupational History     Employer: [employer]    Occupation: Nurse   Tobacco Use    Smoking status: Never    Smokeless tobacco: Never   Vaping Use    Vaping status: Never Used   Substance and Sexual Activity    Alcohol use: Not Currently     Comment: rarely drink .  Only special occasions.    Drug use: Never    Sexual activity: Yes     Partners: Male   Other Topics Concern    Caffeine Concern No    Exercise No    Seat Belt No    Special Diet No    Stress Concern  No    Weight Concern No    Blood Transfusions No     Social Determinants of Health     Financial Resource Strain: Low Risk  (10/22/2020)    [facility]    Overall Financial Resource Strain (CARDIA)     Difficulty of Paying Living Expenses: Not very hard   Food Insecurity: No Food Insecurity (10/22/2020)    [facility]    Hunger Vital Sign     Worried About Running Out of Food in the Last Year: Never true     Ran Out of Food in the Last Year: Never true   Transportation Needs: No Transportation Needs (10/22/2020)    [facility]    PRAPARE - Transportation     Lack of Transportation (Medical): No     Lack of Transportation (Non-Medical): No        Objective:   Physical Exam  Vitals and nursing note reviewed.   Constitutional:       Appearance: Normal appearance.   HENT:      Head: Normocephalic and atraumatic.   Cardiovascular:      Rate and Rhythm: Normal rate and regular rhythm.      Pulses: Normal pulses.      Heart sounds: Normal heart sounds.   Pulmonary:      Effort: Pulmonary effort is normal.      Breath sounds: Normal breath sounds.   Abdominal:      Palpations: Abdomen is soft.   Musculoskeletal:         General: Normal range of motion.      Cervical back: Normal range of motion and neck supple.   Skin:     General: Skin is warm.   Neurological:      Mental Status: She is alert. Mental status is at baseline.   Psychiatric:         Mood and Affect: Mood normal.         Assessment & Plan:   No diagnosis found.  Constipation discussed about high-fiber diet, drink more fluids      Anxiety-discontinue Effexor since she is having constipation,-start Lexapro, follow-up with PCP  No orders of the defined types were placed in this encounter.      Meds This Visit:  Requested Prescriptions      No prescriptions  requested or ordered in this encounter       Imaging & Referrals:  None

## 2024-08-29 NOTE — TELEPHONE ENCOUNTER
From: Yara Westfall  To: Nan Hurtado  Sent: 8/28/2024 7:39 PM CDT  Subject: EFFEXOR SIDE EFFECT    Since this weekend I been worsening experience constipation and felt need to BM but unable to go and having hard stool, even though I eat fiber rich diet. Usually I barely have issues with constipation. I notice Since I started Effexor I notice I have period constipation and hard stool . It just became worse the past weekend.     I know side effect the medication is constipation.  For now Due to side effects I would like to stop Effexor and be back on Lexapro 10mg, which my previous dr prescribed. I have few pills left and will be running out soon.   Can you prescibed me generic Lexapro 10mg?  I have follow up appointment just in case in October.   [facility]

## 2024-08-29 NOTE — TELEPHONE ENCOUNTER
Dr. Hurtado:    Please see patient's message about going back on generic Lexapro 10 mg.    Thank you    *she saw Dr. Charisma Paige yesterday for constipation    Future Appointments   Date Time Provider Department Center   10/23/2024  3:15 PM Nan Hurtado MD [facility]   12/17/2024  8:00 AM [facility]  [place]

## 2024-08-31 RX ORDER — ESCITALOPRAM OXALATE 10 MG/1
10 TABLET ORAL DAILY
Qty: 90 TABLET | Refills: 1 | Status: SHIPPED | OUTPATIENT
Start: 2024-08-31

## 2024-09-18 ENCOUNTER — OFFICE VISIT (OUTPATIENT)
Dept: INTEGRATIVE MEDICINE | Facility: CLINIC | Age: 54
End: 2024-09-18
Payer: COMMERCIAL

## 2024-09-18 DIAGNOSIS — G47.09 OTHER INSOMNIA: ICD-10-CM

## 2024-09-18 DIAGNOSIS — M79.605 PAIN IN BOTH LOWER EXTREMITIES: Primary | ICD-10-CM

## 2024-09-18 DIAGNOSIS — M79.604 PAIN IN BOTH LOWER EXTREMITIES: Primary | ICD-10-CM

## 2024-09-18 PROCEDURE — 97811 ACUP 1/> W/O ESTIM EA ADD 15: CPT | Performed by: ACUPUNCTURIST

## 2024-09-18 PROCEDURE — 97810 ACUP 1/> WO ESTIM 1ST 15 MIN: CPT | Performed by: ACUPUNCTURIST

## 2024-09-19 NOTE — PROGRESS NOTES
Yara Westfall is a 54 year old female No chief complaint on file..     Chief Complaint:     1. Insomnia  2. Leg pain      Self reported health status:      Patient reported severity of symptom(s) over the last 24 hours  With zero reporting no symptoms and 10 reporting the worst severity     Symptom 1: 4  Symptom 2: 2-6; pain worse at night.     HPI: 9/18/2023 The last time I saw/treated Yara was on 9/6/2023. Yara is coming in with complains of Insomnia and bilateral leg pain. She explain her insomnia started when she change medication to escitalopram. She finds it difficult to stay to fall asleep.          HPI: 8/16/2023  Yara reports last session was very good she felt a significant difference. Her insomnia is much better. Has been able sleep and rates her insomnia from 6-7 out 10 to 4-5 out 10. She also reports her urinary frequency and irritation also was decreased from a 5 out of 10 to 2-3 out of 10.     Objective (Pulse, Tongue, Vitals):      Pulse: Red     Tongue: slightly rapid     TCM Diagnosis: Channel Obstruction.     Plan of Care: Acupuncture Therapy     Set 1: TOÑO-3, LI-4, GB-34, ST-39, Randolph men     Set 2: Randolph men     Note:      Patient Goals: Be able to sleep thought the night without hot flashes     Face Time: 29 min minutes  Total Time: 55 minutes     Treatment performed by Marce VALLES LAc. at [facility].    No follow-ups on file.    Marce Torres L.AC  9/19/2024  8:18 AM

## 2024-09-25 ENCOUNTER — OFFICE VISIT (OUTPATIENT)
Dept: INTEGRATIVE MEDICINE | Facility: CLINIC | Age: 54
End: 2024-09-25
Payer: COMMERCIAL

## 2024-09-25 DIAGNOSIS — M79.604 PAIN IN BOTH LOWER EXTREMITIES: ICD-10-CM

## 2024-09-25 DIAGNOSIS — M79.605 PAIN IN BOTH LOWER EXTREMITIES: ICD-10-CM

## 2024-09-25 DIAGNOSIS — G47.00 INSOMNIA, UNSPECIFIED TYPE: Primary | ICD-10-CM

## 2024-09-25 PROCEDURE — 97811 ACUP 1/> W/O ESTIM EA ADD 15: CPT | Performed by: ACUPUNCTURIST

## 2024-09-25 PROCEDURE — 97810 ACUP 1/> WO ESTIM 1ST 15 MIN: CPT | Performed by: ACUPUNCTURIST

## 2024-09-26 NOTE — PROGRESS NOTES
Yara Westfall is a 54 year old female No chief complaint on file..     Chief Complaint:    1. Insomnia  2. Leg pain      Self reported health status:      Patient reported severity of symptom(s) over the last 24 hours  With zero reporting no symptoms and 10 reporting the worst severity     Symptom 1: 4  Symptom 2: 2-6; pain worse at night.     Response to last TX: Yara reports she was able to sleep. She felt a difference.      HPI: 9/18/2023 The last time I saw/treated Yara was on 9/6/2023. Yara is coming in with complains of Insomnia and bilateral leg pain. She explain her insomnia started when she change medication to escitalopram. She finds it difficult to stay to fall asleep.        HPI: 8/16/2023  Yara reports last session was very good she felt a significant difference. Her insomnia is much better. Has been able sleep and rates her insomnia from 6-7 out 10 to 4-5 out 10. She also reports her urinary frequency and irritation also was decreased from a 5 out of 10 to 2-3 out of 10.     Objective (Pulse, Tongue, Vitals):      Pulse: Red     Tongue: slightly rapid     TCM Diagnosis: Channel Obstruction.     Plan of Care: Acupuncture Therapy     Set 1: TOÑO-3, LI-4, GB-34, ST-39     Set 2: WILIAN Omer An mian     Note:      Patient Goals:      Face Time: 29 min minutes  Total Time: 55 minutes         Treatment performed by Marce VALLES LAc. at [facility].    No follow-ups on file.    Marce Torres L.AC  9/26/2024  1:04 PM

## 2024-10-02 ENCOUNTER — OFFICE VISIT (OUTPATIENT)
Dept: INTEGRATIVE MEDICINE | Facility: CLINIC | Age: 54
End: 2024-10-02
Payer: COMMERCIAL

## 2024-10-02 DIAGNOSIS — M79.604 PAIN IN BOTH LOWER EXTREMITIES: Primary | ICD-10-CM

## 2024-10-02 DIAGNOSIS — M79.605 PAIN IN BOTH LOWER EXTREMITIES: Primary | ICD-10-CM

## 2024-10-02 DIAGNOSIS — G47.00 INSOMNIA, UNSPECIFIED TYPE: ICD-10-CM

## 2024-10-02 PROCEDURE — 97811 ACUP 1/> W/O ESTIM EA ADD 15: CPT | Performed by: ACUPUNCTURIST

## 2024-10-02 PROCEDURE — 97810 ACUP 1/> WO ESTIM 1ST 15 MIN: CPT | Performed by: ACUPUNCTURIST

## 2024-10-03 NOTE — PROGRESS NOTES
Yara Westfall is a 54 year old female No chief complaint on file..     Chief Complaint:    Insomnia  Leg pain      Self reported health status:      Patient reported severity of symptom(s) over the last 24 hours  With zero reporting no symptoms and 10 reporting the worst severity     Symptom 1: 4  Symptom 2: 2-6; pain worse at night.     Response to last TX: Yara her sleep was a bit better was able to sleep the day of treatment, but her leg pain remain the same. She feels the first treatment gave her the best relief.        HPI: 9/18/2023 The last time I saw/treated Yara was on 9/6/2023. Yara is coming in with complains of Insomnia and bilateral leg pain. She explain her insomnia started when she change medication to escitalopram. She finds it difficult to stay to fall asleep.        HPI: 8/16/2023  Yara reports last session was very good she felt a significant difference. Her insomnia is much better. Has been able sleep and rates her insomnia from 6-7 out 10 to 4-5 out 10. She also reports her urinary frequency and irritation also was decreased from a 5 out of 10 to 2-3 out of 10.     Objective (Pulse, Tongue, Vitals):      Pulse: Red     Tongue: slightly rapid     TCM Diagnosis: Channel Obstruction.     Plan of Care: Acupuncture Therapy     Set 1: Bilateral: LI-4, TOÑO-3, LI-15, GB-34, SP-9, ST-38, Randolph men, An danelle     Note:      Patient Goals: To significantly reduce leg pain ( 40%) and improve sleep.     Face Time: 28 min minutes  Total Time: 55 minutes          Treatment performed by Marce VALLES LAc. at [facility].    No follow-ups on file.    Marce Torres L.AC  10/3/2024  11:32 AM

## 2024-10-09 ENCOUNTER — OFFICE VISIT (OUTPATIENT)
Dept: INTEGRATIVE MEDICINE | Facility: CLINIC | Age: 54
End: 2024-10-09
Payer: COMMERCIAL

## 2024-10-09 DIAGNOSIS — M79.604 PAIN IN BOTH LOWER EXTREMITIES: Primary | ICD-10-CM

## 2024-10-09 DIAGNOSIS — G47.00 INSOMNIA, UNSPECIFIED TYPE: ICD-10-CM

## 2024-10-09 DIAGNOSIS — M79.605 PAIN IN BOTH LOWER EXTREMITIES: Primary | ICD-10-CM

## 2024-10-09 PROCEDURE — 97810 ACUP 1/> WO ESTIM 1ST 15 MIN: CPT | Performed by: ACUPUNCTURIST

## 2024-10-09 PROCEDURE — 97811 ACUP 1/> W/O ESTIM EA ADD 15: CPT | Performed by: ACUPUNCTURIST

## 2024-10-10 NOTE — PROGRESS NOTES
Yara Westfall is a 54 year old female No chief complaint on file..     Chief Complaint:    Insomnia  Leg pain  Dry mouth     Self reported health status:      Patient reported severity of symptom(s) over the last 24 hours  With zero reporting no symptoms and 10 reporting the worst severity     Symptom 1: 4  Symptom 2: 2-5; pain worse at night.     Response to last TX: Yara reports her sleep improved with being able to fall asleep. She also reports her leg pain slightly improved. Currently, she has no leg pain. Today she like to add to her complains of dry moth.        HPI: 9/18/2023 The last time I saw/treated Yara was on 9/6/2023. Yara is coming in with complains of Insomnia and bilateral leg pain. She explain her insomnia started when she change medication to escitalopram. She finds it difficult to stay to fall asleep.        HPI: 8/16/2023  Yara reports last session was very good she felt a significant difference. Her insomnia is much better. Has been able sleep and rates her insomnia from 6-7 out 10 to 4-5 out 10. She also reports her urinary frequency and irritation also was decreased from a 5 out of 10 to 2-3 out of 10.     Objective (Pulse, Tongue, Vitals):      Pulse: Red     Tongue: slightly rapid     TCM Diagnosis: Channel Obstruction.     Plan of Care: Acupuncture Therapy     Set 1: Bilateral: TOÑO-3, LI-4, ST-38, KID-6, GB-34, SP-9, LI-2, Randolph men, An danelle     Note:      Patient Goals: To significantly reduce leg pain ( 40%) and improve sleep.     Face Time: 28 min minutes  Total Time: 55 minutes        Treatment performed by Marce VALLES LAc. at [facility].    No follow-ups on file.    Marce Torres L.AC  10/10/2024  9:36 AM

## 2024-10-23 ENCOUNTER — OFFICE VISIT (OUTPATIENT)
Dept: INTEGRATIVE MEDICINE | Facility: CLINIC | Age: 54
End: 2024-10-23
Payer: COMMERCIAL

## 2024-10-23 DIAGNOSIS — R68.2 DRY MOUTH: ICD-10-CM

## 2024-10-23 DIAGNOSIS — G47.00 INSOMNIA, UNSPECIFIED TYPE: Primary | ICD-10-CM

## 2024-10-23 PROCEDURE — 97810 ACUP 1/> WO ESTIM 1ST 15 MIN: CPT | Performed by: ACUPUNCTURIST

## 2024-10-23 PROCEDURE — 97811 ACUP 1/> W/O ESTIM EA ADD 15: CPT | Performed by: ACUPUNCTURIST

## 2024-10-23 NOTE — PROGRESS NOTES
Yara Westfall is a 54 year old female No chief complaint on file..     Chief Complaint:    Insomnia  Dry mouth     Self reported health status:      Patient reported severity of symptom(s) over the last 24 hours  With zero reporting no symptoms and 10 reporting the worst severity     Symptom 1: 4  Symptom 2: 5     Response to last TX: Yara reports her her sleep significantly improved. She was able to fall asleep and stay asleep. Her sleep improved for 2 weeks. Yara also reports her leg pain is no longer present and feels to be resolved . Her dry mouth symptoms slightly improved.        HPI: 9/18/2023 The last time I saw/treated Yara was on 9/6/2023. Yara is coming in with complains of Insomnia and bilateral leg pain. She explain her insomnia started when she change medication to escitalopram. She finds it difficult to stay to fall asleep.        HPI: 8/16/2023  Yara reports last session was very good she felt a significant difference. Her insomnia is much better. Has been able sleep and rates her insomnia from 6-7 out 10 to 4-5 out 10. She also reports her urinary frequency and irritation also was decreased from a 5 out of 10 to 2-3 out of 10.     Objective (Pulse, Tongue, Vitals):      Pulse: Red     Tongue: slightly rapid     TCM Diagnosis: Channel Obstruction.     Plan of Care: Acupuncture Therapy     Set 1: Bilateral: TÑOO-3, LI-4, ST-38, KID-6, GB-34, SP-9, LI-2, Randolph men, An danelle     Note:      Patient Goals: To significantly reduce leg pain ( 40%) and improve sleep.     Face Time: 28 min minutes  Total Time: 55 minutes      Treatment performed by Marce VALLES LAc. at [facility].    No follow-ups on file.    Marce Torres L.AC  10/23/2024  2:17 PM

## 2024-10-25 ENCOUNTER — TELEPHONE (OUTPATIENT)
Dept: FAMILY MEDICINE CLINIC | Facility: CLINIC | Age: 54
End: 2024-10-25

## 2024-10-25 NOTE — TELEPHONE ENCOUNTER
Continuous Glucose  (FREESTYLE MOIRA 2 READER) Does not apply Device    Patient must meet ALL the criteria's listed below     -Must be on insulin  -Product must be used continuous long-term monitoring for poorly controlled insulin dependent diabetes  -HgbA1C must be greater than or equal to 7.0%  -Patient must have Type 2 diabetes  -Patient must be on a glucose monitoring regimen of 4 or more a day   or  -Prescriber must be a specialist. Example of a specialist is an endocrinologist.

## 2024-11-20 ENCOUNTER — OFFICE VISIT (OUTPATIENT)
Dept: INTEGRATIVE MEDICINE | Facility: CLINIC | Age: 54
End: 2024-11-20
Payer: COMMERCIAL

## 2024-11-20 DIAGNOSIS — R68.2 DRY MOUTH: ICD-10-CM

## 2024-11-20 DIAGNOSIS — G47.00 INSOMNIA, UNSPECIFIED TYPE: Primary | ICD-10-CM

## 2024-11-20 PROCEDURE — 97811 ACUP 1/> W/O ESTIM EA ADD 15: CPT | Performed by: ACUPUNCTURIST

## 2024-11-20 PROCEDURE — 97810 ACUP 1/> WO ESTIM 1ST 15 MIN: CPT | Performed by: ACUPUNCTURIST

## 2024-11-20 NOTE — PROGRESS NOTES
Yara Westfall is a 54 year old female No chief complaint on file..     Chief Complaint:    Insomnia  Dry mouth     Self reported health status:      Patient reported severity of symptom(s) over the last 24 hours  With zero reporting no symptoms and 10 reporting the worst severity     Symptom 1: 4  Symptom 2: 5     Response to last TX: aYra reports sleep had significantly improved and was the reason she did NOT continue her treatments as I had suggested. Now she is having the same problem of fall asleep and staying asleep she regrets not continuing the acupuncture TX.     Her dry mouth symptoms were slightly better and now its worse again.    HPI: 9/18/2023 The last time I saw/treated Yara was on 9/6/2023. Yara is coming in with complains of Insomnia and bilateral leg pain. She explain her insomnia started when she change medication to escitalopram. She finds it difficult to stay to fall asleep.        HPI: 8/16/2023  Yara reports last session was very good she felt a significant difference. Her insomnia is much better. Has been able sleep and rates her insomnia from 6-7 out 10 to 4-5 out 10. She also reports her urinary frequency and irritation also was decreased from a 5 out of 10 to 2-3 out of 10.     Objective (Pulse, Tongue, Vitals):      Pulse: Red     Tongue: slightly rapid     TCM Diagnosis: Channel Obstruction.     Plan of Care: Acupuncture Therapy     Set 1: Bilateral: TOÑO-3, LI-4, KID-6, LI-2, Randolph abbi, An danelle     Note:      Patient Goals: To significantly reduce leg pain ( 40%) and improve sleep.     Face Time: 28 min minutes  Total Time: 55 minutes     Treatment performed by Marce VALLES LAc. at [facility].    No follow-ups on file.    Marce Torres L.AC  11/20/2024  5:49 PM

## 2024-12-05 ENCOUNTER — OFFICE VISIT (OUTPATIENT)
Dept: INTEGRATIVE MEDICINE | Facility: CLINIC | Age: 54
End: 2024-12-05
Payer: COMMERCIAL

## 2024-12-05 DIAGNOSIS — G25.81 RESTLESS LEGS SYNDROME: Primary | ICD-10-CM

## 2024-12-05 DIAGNOSIS — G47.00 INSOMNIA, UNSPECIFIED TYPE: ICD-10-CM

## 2024-12-05 PROCEDURE — 97810 ACUP 1/> WO ESTIM 1ST 15 MIN: CPT | Performed by: ACUPUNCTURIST

## 2024-12-05 PROCEDURE — 97811 ACUP 1/> W/O ESTIM EA ADD 15: CPT | Performed by: ACUPUNCTURIST

## 2024-12-06 NOTE — PROGRESS NOTES
Yara Westfall is a 54 year old female Acupuncture Therapy.   Chief Complaint: Insomnia  Secondary Complaint: RLS  Tertiary Complaint: Frustration/anxiety      Self reported health status:      Patient reported severity of symptom(s) over the last 24 hours  With zero reporting no symptoms and 10 reporting the worst severity     Symptom 1: 4  Symptom 2:  6-7  Symptom 3:  6     Response to last TX: Have not seen Yara for over 1 year but she has been seeing  sporadically and that helped with insomnia.    HPI: Since last seeing Yara, her hot flashes have resolved. Her insomnia is still present and for a time the Garth erica purdy yin was helpful.  Unfortunately, she struggles to get consistent acupuncture.  Urinary frequency has resolved.    Her main complaints today are restless legs which can be severe and insomnia.  Not long after starting vaginal estrogen, the RLS started, but she is not certain.  She also started taking Lexapro for anxiety but palomares snot believe either of these medications has been helpful. We discussed the possibility of weaning off of these medications.    She states that she feels very frustrated for the past month and this may relate to her lack of sleep.    Is interested in beginning herbal medicine.     Initial Consult 11/18/22: Yara suffers from hot flashes since beginning menopause over 1 year ago. The hot flashes are frequent and it keeps her up at night which has lead to the insomnia. Additionally, she has polyuria at night which has been going on for more than a year,  Her Bowel movements are loose and this has been worse since beginning magnesium as prescribed by Dr. Donovan. We discussed reducing the dose to see if this helps BM.  Her stress is high and she tends to be anxious.  Has a hx of a keloid. Is a pediatric nurse.     Objective (Pulse, Tongue, Vitals): Pulse is thin and wiry. Tongue is unremarkable but there was SLV distention.     TCM Diagnosis: LR/KD  molly treviño with LR wind     Plan of Care: Acupuncture Therapy  First set: Bilateral ulloa men, point zero, GB 21  Second set: RT: PC 6, LG 7, HT 7, ST 40, GB 41 LT: LI 4,SJ 5,  SP 9, 6, 4, KD 3  Chinese herbal medicine:      Patient Goals: Be able to sleep thought the night without hot flashes     Face Time: 28 minutes  Total Time: 50 minutes     Treatment performed by Avel VALLES LAc. at [facility].    No follow-ups on file.    Avel Waller L.AC  7/14/2023  10:46 AM

## 2024-12-17 ENCOUNTER — HOSPITAL ENCOUNTER (OUTPATIENT)
Dept: MAMMOGRAPHY | Age: 54
Discharge: HOME | End: 2024-12-17
Attending: OBSTETRICS & GYNECOLOGY
Payer: COMMERCIAL

## 2024-12-17 DIAGNOSIS — Z12.31 BREAST CANCER SCREENING BY MAMMOGRAM: ICD-10-CM

## 2024-12-17 PROCEDURE — 77067 SCR MAMMO BI INCL CAD: CPT | Performed by: OBSTETRICS & GYNECOLOGY

## 2024-12-17 PROCEDURE — 77063 BREAST TOMOSYNTHESIS BI: CPT | Performed by: OBSTETRICS & GYNECOLOGY

## 2024-12-23 ENCOUNTER — OFFICE VISIT (OUTPATIENT)
Dept: INTEGRATIVE MEDICINE | Facility: CLINIC | Age: 54
End: 2024-12-23
Payer: COMMERCIAL

## 2024-12-23 DIAGNOSIS — G25.81 RESTLESS LEG SYNDROME: Primary | ICD-10-CM

## 2024-12-23 PROCEDURE — 97811 ACUP 1/> W/O ESTIM EA ADD 15: CPT | Performed by: ACUPUNCTURIST

## 2024-12-23 PROCEDURE — 97810 ACUP 1/> WO ESTIM 1ST 15 MIN: CPT | Performed by: ACUPUNCTURIST

## 2024-12-23 NOTE — PROGRESS NOTES
Released to [facility] and message from provider/results was viewed by patient.    Seen by patient Yara Westfall on 12/23/2024 11:45 AM

## 2024-12-23 NOTE — PROGRESS NOTES
Yara Westfall is a 54 year old female Acupuncture Therapy.   Chief Complaint: Insomnia  Secondary Complaint: RLS  Tertiary Complaint: Frustration/anxiety      Self reported health status:      Patient reported severity of symptom(s) over the last 24 hours  With zero reporting no symptoms and 10 reporting the worst severity     Symptom 1: 3  Symptom 2: 4-5  Symptom 3: 4     Response to last TX: Had 2 days of relief in RLS and insomnia. However, she stated that after feeling better, she had a lot of sweets and then her symptoms returned.    HPI: Her main complaint today is the bilateral lower leg pain which comes and goes.  This seems associated with increases in sweets and stress.  We discussed the importance of reducing sweets.    Additionally, she stated that the symptoms began shortly after beginning lexapro and she will talk with her MD to reduce dosage or eliminate medication.    Past tx 11/20./24: Have not seen Yara for over 1 year but she has been seeing  sporadically and that helped with insomnia.    HPI: Since last seeing Yara, her hot flashes have resolved. Her insomnia is still present and for a time the Garth singleton was helpful.  Unfortunately, she struggles to get consistent acupuncture.  Urinary frequency has resolved.    Her main complaints today are restless legs which can be severe and insomnia.  Not long after starting vaginal estrogen, the RLS started, but she is not certain.  She also started taking Lexapro for anxiety but palomares snot believe either of these medications has been helpful. We discussed the possibility of weaning off of these medications.    She states that she feels very frustrated for the past month and this may relate to her lack of sleep.    Is interested in beginning herbal medicine.     Initial Consult 11/18/22: Yara suffers from hot flashes since beginning menopause over 1 year ago. The hot flashes are frequent and it keeps her up at night which  has lead to the insomnia. Additionally, she has polyuria at night which has been going on for more than a year,  Her Bowel movements are loose and this has been worse since beginning magnesium as prescribed by Dr. Donovan. We discussed reducing the dose to see if this helps BM.  Her stress is high and she tends to be anxious.  Has a hx of a keloid. Is a pediatric nurse.     Objective (Pulse, Tongue, Vitals): Pulse is thin and wiry. Tongue is unremarkable but there was SLV distention.     TCM Diagnosis: LR/FIONA treviño with LR wind     Plan of Care: Acupuncture Therapy  First set: Bilateral ulloa men, point zero, GB 21  Second set: RT: PC 6, LG 7, HT 7, ST 40, GB 41 LT: LI 4,SJ 5,  SP 9, 6, 4, KD 3  Chinese herbal medicine: Zuhair hayden shadia guzman wan 4 BID     Patient Goals: Be able to sleep thought the night without hot flashes     Face Time: 28 minutes  Total Time: 50 minutes     Treatment performed by Avel VALLES LAc. at [facility].    No follow-ups on file.    Avel Waller L.AC  7/14/2023  10:46 AM

## 2024-12-30 ENCOUNTER — OFFICE VISIT (OUTPATIENT)
Dept: INTEGRATIVE MEDICINE | Facility: CLINIC | Age: 54
End: 2024-12-30
Payer: COMMERCIAL

## 2024-12-30 DIAGNOSIS — G25.81 RESTLESS LEG SYNDROME: Primary | ICD-10-CM

## 2024-12-30 PROCEDURE — 97810 ACUP 1/> WO ESTIM 1ST 15 MIN: CPT | Performed by: ACUPUNCTURIST

## 2024-12-30 PROCEDURE — 97811 ACUP 1/> W/O ESTIM EA ADD 15: CPT | Performed by: ACUPUNCTURIST

## 2024-12-30 NOTE — PROGRESS NOTES
Yara Westfall is a 54 year old female Acupuncture Therapy.   Chief Complaint: Insomnia  Secondary Complaint: RLS  Tertiary Complaint: Frustration/anxiety      Self reported health status:      Patient reported severity of symptom(s) over the last 24 hours  With zero reporting no symptoms and 10 reporting the worst severity     Symptom 1: 2  Symptom 2: 1  Symptom 3: 3     Response to last TX: After last tx, RLS has not returned.  She is sleeping the through the night.  Anxiety has improved abd she is better able to manage stress.  Feels like she is getting improvement since beginning herbs.    HPI 12/23/24: Her main complaint today is the bilateral lower leg pain which comes and goes.  This seems associated with increases in sweets and stress.  We discussed the importance of reducing sweets.    Additionally, she stated that the symptoms began shortly after beginning lexapro and she will talk with her MD to reduce dosage or eliminate medication.    Past tx 11/20./24: Have not seen Yaar for over 1 year but she has been seeing  sporadically and that helped with insomnia.    HPI: Since last seeing Yara, her hot flashes have resolved. Her insomnia is still present and for a time the Garth singleton was helpful.  Unfortunately, she struggles to get consistent acupuncture.  Urinary frequency has resolved.    Her main complaints today are restless legs which can be severe and insomnia.  Not long after starting vaginal estrogen, the RLS started, but she is not certain.  She also started taking Lexapro for anxiety but palomares snot believe either of these medications has been helpful. We discussed the possibility of weaning off of these medications.    She states that she feels very frustrated for the past month and this may relate to her lack of sleep.    Is interested in beginning herbal medicine.     Initial Consult 11/18/22: Yara suffers from hot flashes since beginning menopause over 1 year ago.  The hot flashes are frequent and it keeps her up at night which has lead to the insomnia. Additionally, she has polyuria at night which has been going on for more than a year,  Her Bowel movements are loose and this has been worse since beginning magnesium as prescribed by Dr. Donovan. We discussed reducing the dose to see if this helps BM.  Her stress is high and she tends to be anxious.  Has a hx of a keloid. Is a pediatric nurse.     Objective (Pulse, Tongue, Vitals): Pulse is thin and wiry. Tongue is unremarkable but there was SLV distention.     TCM Diagnosis: LR/FIONA treviño with LR wind     Plan of Care: Acupuncture Therapy  First set: Bilateral ulloa men,  Second set: RT: PC 6, LG 7, HT 7, ST 40, GB 41 LT: LI 4,SJ 5,  SP 9, 6, 4, KD 3  Chinese herbal medicine: Zuhair hayden shadia guzman wan 4 BID     Patient Goals: Be able to sleep thought the night without hot flashes     Face Time: 28 minutes  Total Time: 50 minutes     Treatment performed by Avel VALLES LAc. at [facility].    No follow-ups on file.    Avel Waller L.AC  7/14/2023  10:46 AM

## 2025-01-15 ENCOUNTER — OFFICE VISIT (OUTPATIENT)
Dept: INTEGRATIVE MEDICINE | Facility: CLINIC | Age: 55
End: 2025-01-15
Payer: COMMERCIAL

## 2025-01-15 DIAGNOSIS — G47.00 INSOMNIA, UNSPECIFIED TYPE: Primary | ICD-10-CM

## 2025-01-15 DIAGNOSIS — G25.81 RESTLESS LEGS SYNDROME: ICD-10-CM

## 2025-01-15 PROCEDURE — 97810 ACUP 1/> WO ESTIM 1ST 15 MIN: CPT | Performed by: ACUPUNCTURIST

## 2025-01-15 PROCEDURE — 97811 ACUP 1/> W/O ESTIM EA ADD 15: CPT | Performed by: ACUPUNCTURIST

## 2025-01-16 NOTE — PROGRESS NOTES
Yara Westfall is a 54 year old female No chief complaint on file..     Chief Complaint:    Insomnia  RLS  Frustration/anxiety       Self reported health status:      Patient reported severity of symptom(s) over the last 24 hours  With zero reporting no symptoms and 10 reporting the worst severity     Symptom 1: 2  Symptom 2: 1     Response to last TX:  After last tx, RLS has not returned.  She is sleeping the through the night.  Anxiety has improved abd she is better able to manage stress.  Feels like she is getting improvement since beginning herbs.      HPI: 9/18/2023 The last time I saw/treated Yara was on 9/6/2023. Yara is coming in with complains of Insomnia and bilateral leg pain. She explain her insomnia started when she change medication to escitalopram. She finds it difficult to stay to fall asleep.        HPI: 8/16/2023  Yara reports last session was very good she felt a significant difference. Her insomnia is much better. Has been able sleep and rates her insomnia from 6-7 out 10 to 4-5 out 10. She also reports her urinary frequency and irritation also was decreased from a 5 out of 10 to 2-3 out of 10.     Objective (Pulse, Tongue, Vitals):      Pulse: Red     Tongue: slightly rapid     TCM Diagnosis: Channel Obstruction.     Plan of Care: Acupuncture Therapy     Set 1: Bilateral: TOÑO-3, LI-4, Kassy Omer     Note:      Patient Goals: To significantly reduce leg pain ( 40%) and improve sleep.     Face Time: 28 min minutes  Total Time: 55 minutes     Treatment performed by Marce VALLES LAc. at [facility].    No follow-ups on file.    Marce Torres L.AC  1/16/2025  11:03 AM

## 2025-01-22 ENCOUNTER — OFFICE VISIT (OUTPATIENT)
Dept: INTEGRATIVE MEDICINE | Facility: CLINIC | Age: 55
End: 2025-01-22
Payer: COMMERCIAL

## 2025-01-22 DIAGNOSIS — G47.00 INSOMNIA, UNSPECIFIED TYPE: Primary | ICD-10-CM

## 2025-01-22 PROCEDURE — 97810 ACUP 1/> WO ESTIM 1ST 15 MIN: CPT | Performed by: ACUPUNCTURIST

## 2025-01-22 PROCEDURE — 97811 ACUP 1/> W/O ESTIM EA ADD 15: CPT | Performed by: ACUPUNCTURIST

## 2025-01-22 NOTE — PROGRESS NOTES
Yara Westfall is a 54 year old female No chief complaint on file..     Chief Complaint:    Insomnia  Frustration/anxiety       Self reported health status:      Patient reported severity of symptom(s) over the last 24 hours  With zero reporting no symptoms and 10 reporting the worst severity     Symptom 1: 2  Symptom 2: 2     Response to last TX:  After last tx, there was no difference in her sleep.     HPI: 9/18/2023 The last time I saw/treated Yara was on 9/6/2023. Yara is coming in with complains of Insomnia and bilateral leg pain. She explain her insomnia started when she change medication to escitalopram. She finds it difficult to stay to fall asleep.        HPI: 8/16/2023  Yara reports last session was very good she felt a significant difference. Her insomnia is much better. Has been able sleep and rates her insomnia from 6-7 out 10 to 4-5 out 10. She also reports her urinary frequency and irritation also was decreased from a 5 out of 10 to 2-3 out of 10.     Objective (Pulse, Tongue, Vitals):      Pulse: Red     Tongue: slightly rapid     TCM Diagnosis: Channel Obstruction.     Plan of Care: Acupuncture Therapy     Set 1: Bilateral: TOÑO-3, KID-6, SP-6, LI-4,Randolph men, yin mimg     Note: We discussed her herb, that Dr. Waller gave her, she will finish what she is taking and reevaluate.     Patient Goals: To significantly reduce leg pain ( 40%) and improve sleep.     Face Time: 28 min minutes  Total Time: 55 minutes     Treatment performed by Marce VALLES LAc. at [facility].    No follow-ups on file.    Marce Torres L.AC  1/16/2025  11:03 AM

## 2025-02-14 ENCOUNTER — OFFICE VISIT (OUTPATIENT)
Dept: INTEGRATIVE MEDICINE | Facility: CLINIC | Age: 55
End: 2025-02-14
Payer: COMMERCIAL

## 2025-02-14 DIAGNOSIS — G47.00 INSOMNIA, UNSPECIFIED TYPE: Primary | ICD-10-CM

## 2025-02-14 PROCEDURE — 97810 ACUP 1/> WO ESTIM 1ST 15 MIN: CPT | Performed by: ACUPUNCTURIST

## 2025-02-14 PROCEDURE — 97811 ACUP 1/> W/O ESTIM EA ADD 15: CPT | Performed by: ACUPUNCTURIST

## 2025-02-14 NOTE — PROGRESS NOTES
Yara Westfall is a 54 year old female Acupuncture Therapy.   Chief Complaint: Insomnia  Secondary Complaint: RLS  Tertiary Complaint: Frustration/anxiety      Self reported health status:      Patient reported severity of symptom(s) over the last 24 hours  With zero reporting no symptoms and 10 reporting the worst severity     Symptom 1: 2  Symptom 2: 1  Symptom 3: 3     Response to last TX: Have not seen Yara since 12/30/24. After last tx, RLS has not returned and she is pleased with the result.      Her insomnia continues to be a struggle and it seems like the herbal formula did not help for more than a week.     Anxiety has improved and she is better able to manage stress.  Feels like she is getting improvement since beginning herbs.    She continues to have lower leg pain which is worsened when standing at work.    Will begin Sulucy schwarz duvall 4 BID for 1 month.    HPI 12/23/24: Her main complaint today is the bilateral lower leg pain which comes and goes.  This seems associated with increases in sweets and stress.  We discussed the importance of reducing sweets.    Additionally, she stated that the symptoms began shortly after beginning lexapro and she will talk with her MD to reduce dosage or eliminate medication.    Past tx 11/20./24: Have not seen Yara for over 1 year but she has been seeing  sporadically and that helped with insomnia.    HPI: Since last seeing Yara, her hot flashes have resolved. Her insomnia is still present and for a time the Garth singleton was helpful.  Unfortunately, she struggles to get consistent acupuncture.  Urinary frequency has resolved.    Her main complaints today are restless legs which can be severe and insomnia.  Not long after starting vaginal estrogen, the RLS started, but she is not certain.  She also started taking Lexapro for anxiety but palomares snot believe either of these medications has been helpful. We discussed the possibility of weaning  off of these medications.    She states that she feels very frustrated for the past month and this may relate to her lack of sleep.    Is interested in beginning herbal medicine.     Initial Consult 11/18/22: Yara suffers from hot flashes since beginning menopause over 1 year ago. The hot flashes are frequent and it keeps her up at night which has lead to the insomnia. Additionally, she has polyuria at night which has been going on for more than a year,  Her Bowel movements are loose and this has been worse since beginning magnesium as prescribed by Dr. Donovan. We discussed reducing the dose to see if this helps BM.  Her stress is high and she tends to be anxious.  Has a hx of a keloid. Is a pediatric nurse.     Objective (Pulse, Tongue, Vitals): Pulse is wiry in both. Tongue is unremarkable but there was 1/4 SLV distention and the tip is reddish.     TCM Diagnosis: LR/FIONA treviño with LR wind     Plan of Care: Acupuncture Therapy  First set: Bilateral ulloa men,  Second set: RT: PC 6, LG 7, HT 7, ST 40, GB 41 LT: LI 4,SJ 5,  SP 9, 6, LR 3,  KD 6  Chinese herbal medicine: Iron duvall 4BID     Patient Goals: Be able to sleep thought the night without hot flashes     Face Time: 28 minutes  Total Time: 50 minutes     Treatment performed by Avel VALLES LAc. at [facility].    No follow-ups on file.    Avel Waller L.AC  7/14/2023  10:46 AM

## 2025-02-26 ENCOUNTER — OFFICE VISIT (OUTPATIENT)
Dept: FAMILY MEDICINE CLINIC | Facility: CLINIC | Age: 55
End: 2025-02-26
Payer: COMMERCIAL

## 2025-02-26 VITALS
TEMPERATURE: 98 F | HEART RATE: 83 BPM | WEIGHT: 158.63 LBS | SYSTOLIC BLOOD PRESSURE: 124 MMHG | DIASTOLIC BLOOD PRESSURE: 72 MMHG | OXYGEN SATURATION: 99 % | BODY MASS INDEX: 27.08 KG/M2 | HEIGHT: 64 IN

## 2025-02-26 DIAGNOSIS — R05.1 ACUTE COUGH: ICD-10-CM

## 2025-02-26 DIAGNOSIS — K59.00 CONSTIPATION, UNSPECIFIED CONSTIPATION TYPE: ICD-10-CM

## 2025-02-26 DIAGNOSIS — R10.13 EPIGASTRIC PAIN: Primary | ICD-10-CM

## 2025-02-26 PROCEDURE — 99213 OFFICE O/P EST LOW 20 MIN: CPT | Performed by: STUDENT IN AN ORGANIZED HEALTH CARE EDUCATION/TRAINING PROGRAM

## 2025-02-26 RX ORDER — PANTOPRAZOLE SODIUM 40 MG/1
40 TABLET, DELAYED RELEASE ORAL
Qty: 30 TABLET | Refills: 3 | Status: SHIPPED | OUTPATIENT
Start: 2025-02-26

## 2025-02-26 RX ORDER — BISACODYL 5 MG/1
5 TABLET, DELAYED RELEASE ORAL
Qty: 30 TABLET | Refills: 0 | Status: SHIPPED | OUTPATIENT
Start: 2025-02-26

## 2025-02-26 RX ORDER — ONDANSETRON 8 MG/1
8 TABLET, FILM COATED ORAL EVERY 8 HOURS PRN
Qty: 30 TABLET | Refills: 3 | Status: SHIPPED | OUTPATIENT
Start: 2025-02-26

## 2025-02-26 RX ORDER — ZALEPLON 5 MG/1
5 CAPSULE ORAL EVERY EVENING
COMMUNITY
Start: 2025-02-10

## 2025-02-26 NOTE — PROGRESS NOTES
HPI:    Patient ID: Yara Westfall is a 54 year old female.    HPI  Pt presenting with abd discomfort.    Onset of vomiting 2/5  Recurrent episodes 2/9, 2/19  Overall improvement  Notes nausea after eating with vomiting  Describes epigastric regurgitation, somewhat improved  Started Tums, Pepcid with mild relief    Notes nighttime cough  Associated chest discomfort with coughing  Denies fever, resp distress      Review of Systems   A comprehensive 10 point review of systems was completed.  Pertinent positives and negatives noted in the the HPI.       Current Outpatient Medications   Medication Sig Dispense Refill    Zaleplon 5 MG Oral Cap Take 1 capsule (5 mg total) by mouth every evening.      pantoprazole 40 MG Oral Tab EC Take 1 tablet (40 mg total) by mouth every morning before breakfast. 30 tablet 3    ondansetron (ZOFRAN) 8 MG tablet Take 1 tablet (8 mg total) by mouth every 8 (eight) hours as needed for Nausea. 30 tablet 3    bisacodyl 5 MG Oral Tab EC Take 1 tablet (5 mg total) by mouth daily as needed for constipation. 30 tablet 0    Continuous Glucose Sensor (FREESTYLE MOIRA 2 SENSOR) Does not apply Misc 1 each every 14 (fourteen) days. 6 each 0    Continuous Glucose  (FREESTYLE MOIRA 2 READER) Does not apply Device 1 each As Directed. 1 each 0    estradiol 0.1 MG/GM Vaginal Cream Place 1 g vaginally nightly x 2 weeks then twice weekly thereafter 3 each 3    losartan 50 MG Oral Tab Take 1 tablet (50 mg total) by mouth every morning. 90 tablet 3    albuterol (PROAIR HFA) 108 (90 Base) MCG/ACT Inhalation Aero Soln Inhale 2 puffs into the lungs every 4 (four) hours as needed for Wheezing. 25.5 g 1    fluticasone-salmeterol 250-50 MCG/ACT Inhalation Aerosol Powder, Breath Activated Inhale 1 puff into the lungs every 12 (twelve) hours. 180 each 1    Mirabegron ER 50 MG Oral Tablet 24 Hr Take 1 tablet (50 mg total) by mouth daily. 90 tablet 3    cetirizine 10 MG Oral Tab Take 1 tablet (10 mg total)  by mouth daily as needed.      melatonin 3 MG Oral Tab 1 tablet (3 mg total) nightly as needed.      escitalopram 20 MG Oral Tab Take 1 tablet (20 mg total) by mouth daily. (Patient not taking: Reported on 2/26/2025) 90 tablet 3    Ergocalciferol (VITAMIN D2) 50 MCG (2000 UT) Oral Tab  (Patient not taking: Reported on 2/26/2025)      MAGNESIUM GLYCINATE  mg. (Patient not taking: Reported on 2/26/2025)      omega-3 fatty acids 1000 MG Oral Cap Take by mouth daily. (Patient not taking: Reported on 2/26/2025)       Allergies:Allergies[1]   Vitals:    02/26/25 1402   BP: 124/72   Pulse: 83   Temp: 98 °F (36.7 °C)   TempSrc: Oral   SpO2: 99%   Weight: 158 lb 9.6 oz (71.9 kg)   Height: 5' 4\" (1.626 m)       Body mass index is 27.22 kg/m².   PHYSICAL EXAM:   Physical Exam  Vitals reviewed.   Constitutional:       General: She is not in acute distress.  HENT:      Right Ear: A middle ear effusion is present.      Left Ear: A middle ear effusion is present.      Nose:      Right Sinus: No maxillary sinus tenderness or frontal sinus tenderness.      Left Sinus: No maxillary sinus tenderness or frontal sinus tenderness.      Mouth/Throat:      Pharynx: No posterior oropharyngeal erythema.      Tonsils: No tonsillar exudate.   Eyes:      Conjunctiva/sclera: Conjunctivae normal.   Cardiovascular:      Rate and Rhythm: Normal rate and regular rhythm.      Pulses: Normal pulses.   Pulmonary:      Effort: Pulmonary effort is normal. No respiratory distress.      Breath sounds: Normal breath sounds.   Abdominal:      General: Bowel sounds are normal.      Palpations: Abdomen is soft.      Tenderness: There is no guarding or rebound.   Musculoskeletal:      Cervical back: Normal range of motion. No tenderness.      Right lower leg: No edema.      Left lower leg: No edema.   Lymphadenopathy:      Cervical: No cervical adenopathy.   Neurological:      General: No focal deficit present.      Mental Status: She is alert and  oriented to person, place, and time. Mental status is at baseline.   Psychiatric:         Mood and Affect: Mood normal.         Behavior: Behavior normal.             ASSESSMENT/PLAN:   1. Epigastric pain  Discussed postviral changes vs dyspepsia  - discussed dietary modifications including avoiding caffeine, sodas, NSAID use, EtOH, and late night eating  - to increase water intake as able  - will start PPI trial  - Zofran PRN  - to call with any worsened symptoms  - pantoprazole 40 MG Oral Tab EC; Take 1 tablet (40 mg total) by mouth every morning before breakfast.  Dispense: 30 tablet; Refill: 3  - ondansetron (ZOFRAN) 8 MG tablet; Take 1 tablet (8 mg total) by mouth every 8 (eight) hours as needed for Nausea.  Dispense: 30 tablet; Refill: 3    2. Constipation, unspecified constipation type  - increased hydration as tolerated, continued exercise as able  - to increase dietary fiber sources  - Miralax fiber supplementation  - will trial bisacodyl dosing for now, consider adding senna  - discussed red flags for urgent reevaluation  - bisacodyl 5 MG Oral Tab EC; Take 1 tablet (5 mg total) by mouth daily as needed for constipation.  Dispense: 30 tablet; Refill: 0    3. Acute cough  - continue supportive care including nasal saline/suction, humidifier use  - increase hydration and rest as tolerated  - discussed red flags for urgent evaluation  - to call with any questions/concerns  - restart Flonase dosing    Pt verbalized understanding and agrees with plan.      No orders of the defined types were placed in this encounter.      Meds This Visit:  Requested Prescriptions     Signed Prescriptions Disp Refills    pantoprazole 40 MG Oral Tab EC 30 tablet 3     Sig: Take 1 tablet (40 mg total) by mouth every morning before breakfast.    ondansetron (ZOFRAN) 8 MG tablet 30 tablet 3     Sig: Take 1 tablet (8 mg total) by mouth every 8 (eight) hours as needed for Nausea.    bisacodyl 5 MG Oral Tab EC 30 tablet 0     Sig: Take  1 tablet (5 mg total) by mouth daily as needed for constipation.       Imaging & Referrals:  None         ID#[id number]         [1] No Known Allergies

## 2025-03-03 ENCOUNTER — OFFICE VISIT (OUTPATIENT)
Dept: INTEGRATIVE MEDICINE | Facility: CLINIC | Age: 55
End: 2025-03-03
Payer: COMMERCIAL

## 2025-03-03 ENCOUNTER — APPOINTMENT (OUTPATIENT)
Dept: GENERAL RADIOLOGY | Facility: HOSPITAL | Age: 55
End: 2025-03-03
Attending: NURSE PRACTITIONER
Payer: COMMERCIAL

## 2025-03-03 ENCOUNTER — HOSPITAL ENCOUNTER (EMERGENCY)
Facility: HOSPITAL | Age: 55
Discharge: HOME | End: 2025-03-03
Attending: EMERGENCY MEDICINE
Payer: COMMERCIAL

## 2025-03-03 ENCOUNTER — TELEPHONE (OUTPATIENT)
Dept: FAMILY MEDICINE CLINIC | Facility: CLINIC | Age: 55
End: 2025-03-03

## 2025-03-03 VITALS
HEIGHT: 64 IN | TEMPERATURE: 97 F | BODY MASS INDEX: 26.63 KG/M2 | WEIGHT: 156 LBS | SYSTOLIC BLOOD PRESSURE: 137 MMHG | RESPIRATION RATE: 12 BRPM | HEART RATE: 78 BPM | OXYGEN SATURATION: 98 % | DIASTOLIC BLOOD PRESSURE: 94 MMHG

## 2025-03-03 DIAGNOSIS — R10.13 EPIGASTRIC PAIN: Primary | ICD-10-CM

## 2025-03-03 LAB
ALBUMIN SERPL-MCNC: 4.6 G/DL (ref 3.2–4.8)
ALBUMIN/GLOB SERPL: 1.7 {RATIO} (ref 1–2)
ALP LIVER SERPL-CCNC: 94 U/L
ALT SERPL-CCNC: 22 U/L
ANION GAP SERPL CALC-SCNC: 8 MMOL/L (ref 0–18)
AST SERPL-CCNC: 18 U/L (ref ?–34)
ATRIAL RATE: 85 BPM
BASOPHILS # BLD AUTO: 0.04 X10(3) UL (ref 0–0.2)
BASOPHILS NFR BLD AUTO: 0.5 %
BILIRUB SERPL-MCNC: 0.4 MG/DL (ref 0.3–1.2)
BUN BLD-MCNC: 10 MG/DL (ref 9–23)
BUN/CREAT SERPL: 13 (ref 10–20)
CALCIUM BLD-MCNC: 8.9 MG/DL (ref 8.7–10.4)
CHLORIDE SERPL-SCNC: 104 MMOL/L (ref 98–112)
CO2 SERPL-SCNC: 27 MMOL/L (ref 21–32)
CREAT BLD-MCNC: 0.77 MG/DL
DEPRECATED RDW RBC AUTO: 35.9 FL (ref 35.1–46.3)
EGFRCR SERPLBLD CKD-EPI 2021: 92 ML/MIN/1.73M2 (ref 60–?)
EOSINOPHIL # BLD AUTO: 0.19 X10(3) UL (ref 0–0.7)
EOSINOPHIL NFR BLD AUTO: 2.2 %
ERYTHROCYTE [DISTWIDTH] IN BLOOD BY AUTOMATED COUNT: 11.2 % (ref 11–15)
GLOBULIN PLAS-MCNC: 2.7 G/DL (ref 2–3.5)
GLUCOSE BLD-MCNC: 143 MG/DL (ref 70–99)
HCT VFR BLD AUTO: 41.7 %
HGB BLD-MCNC: 14.5 G/DL
IMM GRANULOCYTES # BLD AUTO: 0.05 X10(3) UL (ref 0–1)
IMM GRANULOCYTES NFR BLD: 0.6 %
LIPASE SERPL-CCNC: 36 U/L (ref 12–53)
LYMPHOCYTES # BLD AUTO: 1.14 X10(3) UL (ref 1–4)
LYMPHOCYTES NFR BLD AUTO: 13 %
MCH RBC QN AUTO: 30.1 PG (ref 26–34)
MCHC RBC AUTO-ENTMCNC: 34.8 G/DL (ref 31–37)
MCV RBC AUTO: 86.7 FL
MONOCYTES # BLD AUTO: 0.43 X10(3) UL (ref 0.1–1)
MONOCYTES NFR BLD AUTO: 4.9 %
NEUTROPHILS # BLD AUTO: 6.95 X10 (3) UL (ref 1.5–7.7)
NEUTROPHILS # BLD AUTO: 6.95 X10(3) UL (ref 1.5–7.7)
NEUTROPHILS NFR BLD AUTO: 78.8 %
OSMOLALITY SERPL CALC.SUM OF ELEC: 290 MOSM/KG (ref 275–295)
P AXIS: 13 DEGREES
P-R INTERVAL: 182 MS
PLATELET # BLD AUTO: 303 10(3)UL (ref 150–450)
POTASSIUM SERPL-SCNC: 3.7 MMOL/L (ref 3.5–5.1)
PROT SERPL-MCNC: 7.3 G/DL (ref 5.7–8.2)
Q-T INTERVAL: 370 MS
QRS DURATION: 70 MS
QTC CALCULATION (BEZET): 440 MS
R AXIS: 6 DEGREES
RBC # BLD AUTO: 4.81 X10(6)UL
SODIUM SERPL-SCNC: 139 MMOL/L (ref 136–145)
T AXIS: 46 DEGREES
TROPONIN I SERPL HS-MCNC: <3 NG/L
VENTRICULAR RATE: 85 BPM
WBC # BLD AUTO: 8.8 X10(3) UL (ref 4–11)

## 2025-03-03 PROCEDURE — 99284 EMERGENCY DEPT VISIT MOD MDM: CPT

## 2025-03-03 PROCEDURE — 99285 EMERGENCY DEPT VISIT HI MDM: CPT

## 2025-03-03 PROCEDURE — 97811 ACUP 1/> W/O ESTIM EA ADD 15: CPT | Performed by: ACUPUNCTURIST

## 2025-03-03 PROCEDURE — 97810 ACUP 1/> WO ESTIM 1ST 15 MIN: CPT | Performed by: ACUPUNCTURIST

## 2025-03-03 PROCEDURE — 84484 ASSAY OF TROPONIN QUANT: CPT | Performed by: EMERGENCY MEDICINE

## 2025-03-03 PROCEDURE — 93005 ELECTROCARDIOGRAM TRACING: CPT

## 2025-03-03 PROCEDURE — 36415 COLL VENOUS BLD VENIPUNCTURE: CPT

## 2025-03-03 PROCEDURE — 83690 ASSAY OF LIPASE: CPT | Performed by: EMERGENCY MEDICINE

## 2025-03-03 PROCEDURE — 80053 COMPREHEN METABOLIC PANEL: CPT | Performed by: EMERGENCY MEDICINE

## 2025-03-03 PROCEDURE — 85025 COMPLETE CBC W/AUTO DIFF WBC: CPT | Performed by: EMERGENCY MEDICINE

## 2025-03-03 PROCEDURE — 93010 ELECTROCARDIOGRAM REPORT: CPT

## 2025-03-03 PROCEDURE — 71045 X-RAY EXAM CHEST 1 VIEW: CPT | Performed by: NURSE PRACTITIONER

## 2025-03-03 RX ORDER — DICYCLOMINE HCL 20 MG
20 TABLET ORAL 4 TIMES DAILY PRN
Qty: 20 TABLET | Refills: 0 | Status: SHIPPED | OUTPATIENT
Start: 2025-03-03

## 2025-03-03 NOTE — ED INITIAL ASSESSMENT (HPI)
Pt to ED with c/o chest pressure, acid reflux, and vomiting for a couple of weeks. Pt recently placed on Protonix and an antibiotic. Pt states increased chest discomfort last night.

## 2025-03-03 NOTE — TELEPHONE ENCOUNTER
Patient indicated to cancel the message below, that she is going to the emergency room for the chest pain now.

## 2025-03-03 NOTE — PATIENT INSTRUCTIONS
Try eating congee for 5 days and see how you feel.    Congee recipe   Bring 1 cup of miguel rice and 4 cups of water to a boil and then simmer for 1 hour. Stir it periodically until it is like hot cereal. Alternatively, you can use 2 cups of water and two cups of chicken broth. To add more nutrients, you can add pieces of chicken, carrots and peas to the mixture. I suggest adding several slices of cleo to the mixture too.  For flavoring, you can add salt and black pepper.

## 2025-03-03 NOTE — ED PROVIDER NOTES
Patient Seen in: [facility] Emergency Department      History     Chief Complaint   Patient presents with    Chest Pressure     Stated Complaint: chest pressure    Subjective:   HPI      54-year-old female presents for evaluation of epigastric and chest pain.  Patient reports for the past few weeks she has had intermittent vomiting as well as epigastric pain.  Has also noted pain radiating into her left chest.  No shortness of breath, vomiting, diarrhea.  Has started taking Protonix.    Objective:     No pertinent past medical history.            Past Surgical History:   Procedure Laterality Date    Colonoscopy  2022    D & c      2 miscarriages      O4--    Also 2/15/2007 another childbirth                No pertinent social history.                Physical Exam     ED Triage Vitals   BP 25 1510 (!) 159/100   Pulse 25 1510 85   Resp 25 1510 16   Temp 25 1510 97.2 °F (36.2 °C)   Temp src 25 1510 Temporal   SpO2 25 1510 99 %   O2 Device 25 1715 None (Room air)       Current Vitals:   Vital Signs  BP: (!) 137/94  Pulse: 78  Resp: 12  Temp: 97.2 °F (36.2 °C)  Temp src: Temporal  MAP (mmHg): (!) 107    Oxygen Therapy  SpO2: 98 %  O2 Device: None (Room air)        Physical Exam  Vitals and nursing note reviewed.   Constitutional:       General: She is not in acute distress.     Appearance: She is well-developed.   HENT:      Head: Normocephalic and atraumatic.   Eyes:      Conjunctiva/sclera: Conjunctivae normal.   Cardiovascular:      Rate and Rhythm: Normal rate and regular rhythm.      Heart sounds: Normal heart sounds.   Pulmonary:      Effort: Pulmonary effort is normal. No respiratory distress.      Breath sounds: Normal breath sounds.   Abdominal:      General: Bowel sounds are normal. There is no distension.      Palpations: Abdomen is soft.      Tenderness: There is abdominal tenderness in the epigastric area. There is no guarding or rebound.    Musculoskeletal:         General: Normal range of motion.      Cervical back: Normal range of motion and neck supple.   Skin:     General: Skin is warm and dry.      Findings: No rash.   Neurological:      General: No focal deficit present.      Mental Status: She is alert and oriented to person, place, and time.             ED Course     Labs Reviewed   COMP METABOLIC PANEL (14) - Abnormal; Notable for the following components:       Result Value    Glucose 143 (*)     All other components within normal limits   TROPONIN I HIGH SENSITIVITY - Normal   LIPASE - Normal   CBC WITH DIFFERENTIAL WITH PLATELET   RAINBOW DRAW LAVENDER   RAINBOW DRAW LIGHT GREEN     EKG    Rate, intervals and axes as noted on EKG Report.  Rate: 85  Rhythm: Sinus Rhythm  Reading: Sinus rhythm, intervals within normal limits, no STEMI                Imaging Results Available and Reviewed while in ED:   XR CHEST AP PORTABLE  (CPT=71045)   Final Result   PROCEDURE: XR CHEST AP PORTABLE  (CPT=71045)       COMPARISON: None.       INDICATIONS: Chest pressure       TECHNIQUE:   Single view.         FINDINGS:    CARDIAC/VASC: Normal.  No cardiac silhouette abnormality or cardiomegaly.     Unremarkable pulmonary vasculature.    MEDIAST/DIPIKA:   No visible mass or adenopathy.   LUNGS/PLEURA: Normal.  No significant pulmonary parenchymal abnormalities.     No effusion or pleural thickening.   BONES: No fracture or visible bony lesion.   OTHER: Negative.                     =====   CONCLUSION: No radiographic evidence of acute cardiopulmonary abnormality.               Dictated by (CST): Robbie Lake MD on 3/03/2025 at 4:53 PM        Finalized by (CST): Robbie Lake MD on 3/03/2025 at 4:57 PM                   ED Medications Administered:   Medications   mylanta-dicyclomine-lidocaine 2% (G.I. Cocktail) oral liquid ( Oral Given 3/3/25 1757)         Vitals:    03/03/25 1510 03/03/25 1715 03/03/25 1915   BP: (!) 159/100 (!) 147/92 (!) 137/94   Pulse: 85  83 78   Resp: 16 16 12   Temp: 97.2 °F (36.2 °C)     TempSrc: Temporal     SpO2: 99% 99% 98%   Weight: 70.8 kg     Height: 162.6 cm (5' 4\")       *I personally reviewed and interpreted all ED vitals.         MDM              Medical Decision Making  Differential diagnosis includes but is not limited to gastritis, peptic ulcer disease, pancreatitis, cholelithiasis, cholecystitis, ACS, aortic aneurysm or dissection    Your workup is reassuring, discussed differential and results with patient.  Advised supportive care, close outpatient follow-up, return precautions.    Problems Addressed:  Epigastric pain: acute illness or injury    Amount and/or Complexity of Data Reviewed  External Data Reviewed: labs.     Details: CMP and CBC stable compared to 5/1/2024  Labs: ordered.  Radiology: ordered and independent interpretation performed.     Details: Chest x-ray image reviewed, no obvious pneumothorax, other incidental findings deferred to radiology  ECG/medicine tests: ordered and independent interpretation performed. Decision-making details documented in ED Course.    Risk  Prescription drug management.        Disposition and Plan     Clinical Impression:  1. Epigastric pain         Disposition:  Discharge  3/3/2025  7:16 pm    Follow-up:  Nan Hurtado MD  [address] [place] [id number]  [phone]    Follow up in 1 week(s)  For follow up          Medications Prescribed:  Discharge Medication List as of 3/3/2025  7:27 PM        START taking these medications    Details   dicyclomine 20 MG Oral Tab Take 1 tablet (20 mg total) by mouth 4 (four) times daily as needed., Normal, Disp-20 tablet, R-0                 Supplementary Documentation:

## 2025-03-03 NOTE — TELEPHONE ENCOUNTER
Patient was prescribed pantoprazole 40 MG Oral Tab EC and is asking if she can take the medication at night because she still has symptoms at night.  Please call; patient will be available after 1pm today due to another doctor appointment.

## 2025-03-03 NOTE — TELEPHONE ENCOUNTER
Dr. Hurtado: please advise if you are okay with patient taking pantoprazole at night or with last meal?

## 2025-03-03 NOTE — PROGRESS NOTES
Yara Westfall is a 54 year old female Acupuncture Therapy.   Chief Complaint: Epigastric pain  Secondary Complaint: vomiting  Tertiary Complaint: Frustration/anxiety      Self reported health status:      Patient reported severity of symptom(s) over the last 24 hours  With zero reporting no symptoms and 10 reporting the worst severity     Symptom 1: 8-9  Symptom 2: 4-5  Symptom 3: 3     Response to last TX: After the last tx, her insomnia and anxiety reduced significantly and her BP dropped significantly.    Unfortunately, she contracted a GI virus 3 weeks ago and even thought the BM are normal, she has consistent epigastric pain and periodic vomiting.  The vomiting has improved but she still has GERD at night.    We discussed beginning a congee diet for 5 days and beginning herbal medicine coleen cheri infante qi hartman 4 BID    HPI 12/23/24: Her main complaint today is the bilateral lower leg pain which comes and goes.  This seems associated with increases in sweets and stress.  We discussed the importance of reducing sweets.    Additionally, she stated that the symptoms began shortly after beginning lexapro and she will talk with her MD to reduce dosage or eliminate medication.    Past tx 11/20./24: Have not seen Yara for over 1 year but she has been seeing  sporadically and that helped with insomnia.    HPI: Since last seeing Yara, her hot flashes have resolved. Her insomnia is still present and for a time the Garth singleton was helpful.  Unfortunately, she struggles to get consistent acupuncture.  Urinary frequency has resolved.    Her main complaints today are restless legs which can be severe and insomnia.  Not long after starting vaginal estrogen, the RLS started, but she is not certain.  She also started taking Lexapro for anxiety but palomares snot believe either of these medications has been helpful. We discussed the possibility of weaning off of these medications.    She states that she  feels very frustrated for the past month and this may relate to her lack of sleep.    Is interested in beginning herbal medicine.     Initial Consult 11/18/22: Yara suffers from hot flashes since beginning menopause over 1 year ago. The hot flashes are frequent and it keeps her up at night which has lead to the insomnia. Additionally, she has polyuria at night which has been going on for more than a year,  Her Bowel movements are loose and this has been worse since beginning magnesium as prescribed by Dr. Donovan. We discussed reducing the dose to see if this helps BM.  Her stress is high and she tends to be anxious.  Has a hx of a keloid. Is a pediatric nurse.     Objective (Pulse, Tongue, Vitals): Pulse is wiry in both. Tongue is unremarkable but there was 1/4 SLV distention and the tip is reddish.     TCM Diagnosis: LR/FIONA treviño with LR wind     Plan of Care: Acupuncture Therapy  First set: Bilateral ulloa men,  Second set: LT ST 14-SP 4 estim, ST 15 -RT SP 4 estim  Third set: Amandeep 17-12 estim  Chinese herbal medicine: Pause Iron schwarz duvall 4BID and begin coleen woodward hartman 4BID     Patient Goals: Be able to sleep thought the night without hot flashes     Face Time: 28 minutes  Total Time: 50 minutes     Treatment performed by Avel VALLES LAc. at [facility].    No follow-ups on file.    Avel Waller L.AC  7/14/2023  10:46 AM

## 2025-03-04 NOTE — DISCHARGE INSTRUCTIONS
Continue taking pantoprazole once a day.    Take Pepcid every 12 hours as needed for pain.    You can also take Bentyl as needed for abdominal pain.    Avoid greasy, fatty, spicy, fried foods as well as caffeine, alcohol, citrus.    See primary care within the next week for follow-up appointment.

## 2025-03-05 ENCOUNTER — OFFICE VISIT (OUTPATIENT)
Dept: FAMILY MEDICINE CLINIC | Facility: CLINIC | Age: 55
End: 2025-03-05
Payer: COMMERCIAL

## 2025-03-05 VITALS
HEIGHT: 64 IN | HEART RATE: 76 BPM | SYSTOLIC BLOOD PRESSURE: 115 MMHG | DIASTOLIC BLOOD PRESSURE: 81 MMHG | WEIGHT: 157.81 LBS | BODY MASS INDEX: 26.94 KG/M2

## 2025-03-05 DIAGNOSIS — R10.10 UPPER ABDOMINAL PAIN: Primary | ICD-10-CM

## 2025-03-05 PROCEDURE — 99214 OFFICE O/P EST MOD 30 MIN: CPT | Performed by: STUDENT IN AN ORGANIZED HEALTH CARE EDUCATION/TRAINING PROGRAM

## 2025-03-05 RX ORDER — SUCRALFATE 1 G/1
1 TABLET ORAL
Qty: 30 TABLET | Refills: 0 | Status: SHIPPED | OUTPATIENT
Start: 2025-03-05 | End: 2026-02-28

## 2025-03-05 NOTE — PROGRESS NOTES
HPI:    Patient ID: Yara Westfall is a 54 year old female.    HPI  Pt presenting for follow-up.    Initial episode of vomiting 2/5  Recurrent episodes 2/9, 2/19  Reports recurrent epigastric pain   Associated nausea after eating with vomiting  Seen in ER 3/3 -- started on pantoprazole with temporary improvement  Reports regular stools without straining    Review of Systems   A comprehensive 10 point review of systems was completed.  Pertinent positives and negatives noted in the the HPI.       Current Outpatient Medications   Medication Sig Dispense Refill    sucralfate (CARAFATE) 1 g Oral Tab Take 1 tablet (1 g total) by mouth 4 (four) times daily before meals and nightly. 30 tablet 0    dicyclomine 20 MG Oral Tab Take 1 tablet (20 mg total) by mouth 4 (four) times daily as needed. 20 tablet 0    Zaleplon 5 MG Oral Cap Take 1 capsule (5 mg total) by mouth every evening.      pantoprazole 40 MG Oral Tab EC Take 1 tablet (40 mg total) by mouth every morning before breakfast. 30 tablet 3    ondansetron (ZOFRAN) 8 MG tablet Take 1 tablet (8 mg total) by mouth every 8 (eight) hours as needed for Nausea. 30 tablet 3    bisacodyl 5 MG Oral Tab EC Take 1 tablet (5 mg total) by mouth daily as needed for constipation. 30 tablet 0    Continuous Glucose Sensor (FREESTYLE MOIRA 2 SENSOR) Does not apply Misc 1 each every 14 (fourteen) days. 6 each 0    Continuous Glucose  (FREESTYLE MOIRA 2 READER) Does not apply Device 1 each As Directed. 1 each 0    estradiol 0.1 MG/GM Vaginal Cream Place 1 g vaginally nightly x 2 weeks then twice weekly thereafter 3 each 3    losartan 50 MG Oral Tab Take 1 tablet (50 mg total) by mouth every morning. 90 tablet 3    albuterol (PROAIR HFA) 108 (90 Base) MCG/ACT Inhalation Aero Soln Inhale 2 puffs into the lungs every 4 (four) hours as needed for Wheezing. 25.5 g 1    fluticasone-salmeterol 250-50 MCG/ACT Inhalation Aerosol Powder, Breath Activated Inhale 1 puff into the lungs  every 12 (twelve) hours. 180 each 1    Mirabegron ER 50 MG Oral Tablet 24 Hr Take 1 tablet (50 mg total) by mouth daily. 90 tablet 3    cetirizine 10 MG Oral Tab Take 1 tablet (10 mg total) by mouth daily as needed.      melatonin 3 MG Oral Tab 1 tablet (3 mg total) nightly as needed.      escitalopram 20 MG Oral Tab Take 1 tablet (20 mg total) by mouth daily. (Patient not taking: Reported on 3/5/2025) 90 tablet 3    Ergocalciferol (VITAMIN D2) 50 MCG (2000 UT) Oral Tab  (Patient not taking: Reported on 3/5/2025)      MAGNESIUM GLYCINATE  mg. (Patient not taking: Reported on 3/5/2025)      omega-3 fatty acids 1000 MG Oral Cap Take by mouth daily. (Patient not taking: Reported on 3/5/2025)       Allergies:Allergies[1]   Vitals:    03/05/25 1336   BP: 115/81   Pulse: 76   Weight: 157 lb 12.8 oz (71.6 kg)   Height: 5' 4\" (1.626 m)       Body mass index is 27.09 kg/m².   PHYSICAL EXAM:   Physical Exam  Vitals reviewed.   Constitutional:       General: She is not in acute distress.  Eyes:      Conjunctiva/sclera: Conjunctivae normal.   Cardiovascular:      Rate and Rhythm: Normal rate and regular rhythm.      Pulses: Normal pulses.   Pulmonary:      Effort: Pulmonary effort is normal.      Breath sounds: Normal breath sounds.   Abdominal:      General: Bowel sounds are normal.      Palpations: Abdomen is soft.      Tenderness: There is abdominal tenderness in the right upper quadrant and epigastric area.   Musculoskeletal:      Right lower leg: No edema.      Left lower leg: No edema.   Lymphadenopathy:      Cervical: No cervical adenopathy.   Neurological:      General: No focal deficit present.      Mental Status: She is alert and oriented to person, place, and time. Mental status is at baseline.   Psychiatric:         Mood and Affect: Mood normal.         Behavior: Behavior normal.             ASSESSMENT/PLAN:   1. Upper abdominal pain  Discussed DDx  Pt reports clinical improvement with PPI dosing  - will  increase to BID, add Carafate dosing  - discussed role of RUQ US   - anticipate GI eval if no improvement  - discussed red flags for urgent reevaluation  - to call with any questions/concerns  - US ABDOMEN LIMITED (CPT=76705); Future  - sucralfate (CARAFATE) 1 g Oral Tab; Take 1 tablet (1 g total) by mouth 4 (four) times daily before meals and nightly.  Dispense: 30 tablet; Refill: 0  - Gastro Referral - [place] [facility]    Pt verbalized understanding and agrees with plan.    No orders of the defined types were placed in this encounter.      Meds This Visit:  Requested Prescriptions     Signed Prescriptions Disp Refills    sucralfate (CARAFATE) 1 g Oral Tab 30 tablet 0     Sig: Take 1 tablet (1 g total) by mouth 4 (four) times daily before meals and nightly.       Imaging & Referrals:  GASTRO - INTERNAL  US ABDOMEN LIMITED (CPT=76705)         ID#[id number]         [1] No Known Allergies

## 2025-03-07 ENCOUNTER — HOSPITAL ENCOUNTER (OUTPATIENT)
Dept: ULTRASOUND IMAGING | Age: 55
Discharge: HOME | End: 2025-03-07
Attending: STUDENT IN AN ORGANIZED HEALTH CARE EDUCATION/TRAINING PROGRAM
Payer: COMMERCIAL

## 2025-03-07 DIAGNOSIS — R10.10 UPPER ABDOMINAL PAIN: ICD-10-CM

## 2025-03-07 PROCEDURE — 76700 US EXAM ABDOM COMPLETE: CPT | Performed by: STUDENT IN AN ORGANIZED HEALTH CARE EDUCATION/TRAINING PROGRAM

## 2025-03-09 DIAGNOSIS — K59.00 CONSTIPATION, UNSPECIFIED CONSTIPATION TYPE: ICD-10-CM

## 2025-03-09 DIAGNOSIS — R10.10 UPPER ABDOMINAL PAIN: ICD-10-CM

## 2025-03-09 DIAGNOSIS — G47.00 INSOMNIA, UNSPECIFIED TYPE: Primary | ICD-10-CM

## 2025-03-09 DIAGNOSIS — I10 ESSENTIAL HYPERTENSION: ICD-10-CM

## 2025-03-12 ENCOUNTER — OFFICE VISIT (OUTPATIENT)
Facility: CLINIC | Age: 55
End: 2025-03-12
Payer: COMMERCIAL

## 2025-03-12 ENCOUNTER — TELEPHONE (OUTPATIENT)
Facility: CLINIC | Age: 55
End: 2025-03-12

## 2025-03-12 VITALS
BODY MASS INDEX: 26.8 KG/M2 | HEIGHT: 64 IN | HEART RATE: 101 BPM | DIASTOLIC BLOOD PRESSURE: 81 MMHG | WEIGHT: 157 LBS | SYSTOLIC BLOOD PRESSURE: 159 MMHG

## 2025-03-12 DIAGNOSIS — R10.10 UPPER ABDOMINAL PAIN: ICD-10-CM

## 2025-03-12 DIAGNOSIS — K21.9 GASTROESOPHAGEAL REFLUX DISEASE, UNSPECIFIED WHETHER ESOPHAGITIS PRESENT: ICD-10-CM

## 2025-03-12 DIAGNOSIS — R10.13 EPIGASTRIC PAIN: Primary | ICD-10-CM

## 2025-03-12 DIAGNOSIS — R10.13 EPIGASTRIC PAIN: ICD-10-CM

## 2025-03-12 DIAGNOSIS — K76.0 FATTY LIVER: Primary | ICD-10-CM

## 2025-03-12 PROCEDURE — 99203 OFFICE O/P NEW LOW 30 MIN: CPT | Performed by: INTERNAL MEDICINE

## 2025-03-12 RX ORDER — BISACODYL 5 MG/1
5 TABLET, DELAYED RELEASE ORAL DAILY PRN
Qty: 30 TABLET | Refills: 3 | Status: SHIPPED | OUTPATIENT
Start: 2025-03-12

## 2025-03-12 RX ORDER — PANTOPRAZOLE SODIUM 40 MG/1
40 TABLET, DELAYED RELEASE ORAL
Qty: 60 TABLET | Refills: 1 | Status: SHIPPED | OUTPATIENT
Start: 2025-03-12

## 2025-03-12 RX ORDER — SUCRALFATE 1 G/1
1 TABLET ORAL
Qty: 120 TABLET | Refills: 0 | Status: SHIPPED | OUTPATIENT
Start: 2025-03-12 | End: 2025-04-11

## 2025-03-12 RX ORDER — SUCRALFATE 1 G/1
1 TABLET ORAL 4 TIMES DAILY
Qty: 30 TABLET | Refills: 0 | OUTPATIENT
Start: 2025-03-12

## 2025-03-12 RX ORDER — ZALEPLON 5 MG/1
5 CAPSULE ORAL EVERY EVENING
Qty: 90 CAPSULE | Refills: 0 | Status: SHIPPED | OUTPATIENT
Start: 2025-03-12

## 2025-03-12 RX ORDER — LOSARTAN POTASSIUM 50 MG/1
50 TABLET ORAL DAILY
Qty: 90 TABLET | Refills: 0 | Status: SHIPPED | OUTPATIENT
Start: 2025-03-12

## 2025-03-12 NOTE — TELEPHONE ENCOUNTER
Scheduled for:  EGD [id number]  Provider Name:  Dr. Aguilar  Date:  4/4/2025  Location:   [facility]  Sedation:  MAC  Time:  11:30 AM - Patient is aware NELM will call the day before with arrival time.  Prep:  NPO after midnight  Meds/Allergies Reconciled?:  Physician reviewed   Diagnosis with codes:  Epigastric pain R10.13; GERD K21.9  Was patient informed to call insurance with codes (Y/N):  Yes, I confirmed [employer] insurance with the patient.   Referral sent?:  Referral was sent at the time of electronic surgical scheduling.   EM or [facility] notified?:  I sent an electronic request to Endo Scheduling and received a confirmation today.   Medication Orders:  Hold multivitamins/supplements one week prior to procedure.  Misc Orders:  N/A     Further instructions given by staff:  I discussed the prep instructions with the patient which she verbally understood and is aware that I will send the instructions today.

## 2025-03-12 NOTE — PROGRESS NOTES
Yara Westfall is a 54 year old female.    HPI:     Chief Complaint   Patient presents with    Consult     Nausea after eating; Last CLN ; ER follow up; test results from US; stomach discomfort; heartburn    The patient is a 54-year-old with a history of chronic reflux, epigastric pain, recent viral illness, colon polyps, fatty liver who is seen today in the office for consultation.    The patient describes chronic GERD and epigastric discomfort.  Does not seem to radiate and does seem to be related to meals/p.o. intake.  She has been taking a PPI/pantoprazole which has been helping and using Carafate, she is currently taking the Carafate as needed.    And also has noted to have fatty liver.  No prior history of liver disease, ascites, jaundice or other related issues.    She sounds like she is recovering from a viral illness, in February she had an episode of vomiting then heartburn flared up.  She was in the ER on March 3 with chest pain and EKG was negative.  She is getting better from this perspective.  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    The patient has been taking Naprosyn, she does exercise and does yoga and currently has been holding off and using the Naprosyn.    Prior colonoscopy in , she was told she had colon polyps and return in 5 years time.  Denies any lower GI symptoms at this time, blood per rectum.    HISTORY:  Past Medical History:    Allergic rhinitis    Seldom / more occurence when rains    Amenorrhea    had not had [id number]    Asthma (HCC)    Essential hypertension    Dx late ’s    Sleep apnea    not compliant with cpap since . doesnt tolerate cpap      Past Surgical History:   Procedure Laterality Date    Colonoscopy  2022    D & c  2006    2 miscarriages      O4-18-03    Also 2/15/2007 another childbirth      Family History   Problem Relation Age of Onset    Hypertension Mother     Lipids Mother     Asthma  Father     Hypertension Father     Lipids Father     Ovarian Cancer Sister     Other (Ovarian tumor) Sister 37        s/p resection, but still being monitored for cancer q 6months    Diabetes Maternal Grandmother     Dementia Maternal Grandfather     Asthma Paternal Grandmother     Cancer Paternal Grandfather         do not  know name. He  young.    Breast Cancer Neg     Colon Cancer Neg     Uterine Cancer Neg       Social History:   Social History     Socioeconomic History    Marital status:    Occupational History     Employer: [employer]    Occupation: Nurse   Tobacco Use    Smoking status: Never    Smokeless tobacco: Never   Vaping Use    Vaping status: Never Used   Substance and Sexual Activity    Alcohol use: Not Currently     Comment: rarely drink .  Only special occasions.    Drug use: Never    Sexual activity: Yes     Partners: Male   Other Topics Concern    Caffeine Concern No    Exercise No    Seat Belt No    Special Diet No    Stress Concern No    Weight Concern No    Blood Transfusions No     Social Drivers of Health     Food Insecurity: No Food Insecurity (10/22/2020)    [facility]    Hunger Vital Sign     Worried About Running Out of Food in the Last Year: Never true     Ran Out of Food in the Last Year: Never true   Transportation Needs: No Transportation Needs (10/22/2020)    Received from [facility], [facility]    PRAPARE - Transportation     Lack of Transportation (Medical): No     Lack of Transportation (Non-Medical): No        Medications (Active prior to today's visit):  Current Outpatient Medications   Medication Sig Dispense Refill    sucralfate (CARAFATE) 1 g Oral Tab Take 1 tablet (1 g total) by mouth 4 (four) times daily before meals and nightly. 30 tablet 0    dicyclomine 20 MG Oral Tab Take 1 tablet (20 mg total) by mouth 4 (four) times daily as needed. 20  tablet 0    Zaleplon 5 MG Oral Cap Take 1 capsule (5 mg total) by mouth every evening.      pantoprazole 40 MG Oral Tab EC Take 1 tablet (40 mg total) by mouth every morning before breakfast. 30 tablet 3    ondansetron (ZOFRAN) 8 MG tablet Take 1 tablet (8 mg total) by mouth every 8 (eight) hours as needed for Nausea. 30 tablet 3    bisacodyl 5 MG Oral Tab EC Take 1 tablet (5 mg total) by mouth daily as needed for constipation. 30 tablet 0    Continuous Glucose Sensor (FREESTYLE MOIRA 2 SENSOR) Does not apply Misc 1 each every 14 (fourteen) days. 6 each 0    Continuous Glucose  (FREESTYLE MOIRA 2 READER) Does not apply Device 1 each As Directed. 1 each 0    estradiol 0.1 MG/GM Vaginal Cream Place 1 g vaginally nightly x 2 weeks then twice weekly thereafter 3 each 3    losartan 50 MG Oral Tab Take 1 tablet (50 mg total) by mouth every morning. 90 tablet 3    albuterol (PROAIR HFA) 108 (90 Base) MCG/ACT Inhalation Aero Soln Inhale 2 puffs into the lungs every 4 (four) hours as needed for Wheezing. 25.5 g 1    fluticasone-salmeterol 250-50 MCG/ACT Inhalation Aerosol Powder, Breath Activated Inhale 1 puff into the lungs every 12 (twelve) hours. 180 each 1    Mirabegron ER 50 MG Oral Tablet 24 Hr Take 1 tablet (50 mg total) by mouth daily. 90 tablet 3    cetirizine 10 MG Oral Tab Take 1 tablet (10 mg total) by mouth daily as needed.      melatonin 3 MG Oral Tab 1 tablet (3 mg total) nightly as needed.      escitalopram 20 MG Oral Tab Take 1 tablet (20 mg total) by mouth daily. (Patient not taking: Reported on 2/26/2025) 90 tablet 3    Ergocalciferol (VITAMIN D2) 50 MCG (2000 UT) Oral Tab  (Patient not taking: Reported on 2/26/2025)      MAGNESIUM GLYCINATE  mg. (Patient not taking: Reported on 2/26/2025)      omega-3 fatty acids 1000 MG Oral Cap Take by mouth daily. (Patient not taking: Reported on 2/26/2025)         Allergies:  Allergies[1]      ROS:   The patient denies any chest pain or shortness of  breath,  No neurologic or dermatologic symptoms.     PHYSICAL EXAM:   Blood pressure 159/81, pulse 101, height 5' 4\" (1.626 m), weight 157 lb (71.2 kg), last menstrual period 04/12/2021.    The patient appears their stated age and is in no acute distress  HEENT- anicteric sclera, neck no lymphadnopathy, OP- clear with no masses or lesions  Chest- Clear bilaterally, no wheezing,  Heart- regular rate, no murmur or gallop  Abdomen- Soft and nontender, nondistended  Ext- no clubbing or cyanosis  Skin- no rashes or lesions  Neuro- appropriate response, alert, no confusion  .  ASSESSMENT/PLAN:   Assessment     Epigastric pain  GERD  Fatty liver  History colon polyps    The patient has a history of chronic reflux, fatty liver, colon polyps.  She has backed off on her use of NSAIDs and with the pantoprazole and Carafate she has noted improvement in her epigastric discomfort and reflux.  Possibility of ulcer, gastritis or other possibilities is raised.  I did discuss fatty liver with her, plan liver elastography.  She will continue with healthy diet and exercise and healthy weight.    Patient's repeat colonoscopy 1/20/2027.    Plan  Epigastric pain   GERD  - continue on pantoprazole and carafate ( as needed)  - EGD with MAC    Fatty liver  - liver utlrasound with elastography  - continue healthy diet and weight and exercise    Hx colon polyps   - repeat colonoscopy in 2027          Orders This Visit:  No orders of the defined types were placed in this encounter.      Meds This Visit:  Requested Prescriptions      No prescriptions requested or ordered in this encounter       Imaging & Referrals:  None       Estevan Aguilar MD  [facility]              [1] No Known Allergies

## 2025-03-12 NOTE — PATIENT INSTRUCTIONS
Epigastric pain   GERD  - continue on pantoprazole and carafate ( as needed)  - EGD with MAC    Fatty liver  - liver utlrasound with elastography  - continue healthy diet and weight and exercise    Hx colon polyps   - repeat colonoscopy in 2027

## 2025-03-12 NOTE — TELEPHONE ENCOUNTER
Please review;     Please see message below for upcoming appointment.    Future Appointments   Date Time Provider Department Center   5/14/2025  1:30 PM aNn Hurtado MD [facility]

## 2025-03-17 ENCOUNTER — HOSPITAL ENCOUNTER (OUTPATIENT)
Age: 55
Discharge: HOME | End: 2025-03-17
Payer: COMMERCIAL

## 2025-03-17 ENCOUNTER — APPOINTMENT (OUTPATIENT)
Dept: GENERAL RADIOLOGY | Age: 55
End: 2025-03-17
Attending: NURSE PRACTITIONER
Payer: COMMERCIAL

## 2025-03-17 VITALS
TEMPERATURE: 99 F | DIASTOLIC BLOOD PRESSURE: 96 MMHG | OXYGEN SATURATION: 100 % | RESPIRATION RATE: 18 BRPM | SYSTOLIC BLOOD PRESSURE: 135 MMHG | HEART RATE: 87 BPM

## 2025-03-17 DIAGNOSIS — J45.40 MODERATE PERSISTENT ASTHMA WITHOUT COMPLICATION (HCC): ICD-10-CM

## 2025-03-17 DIAGNOSIS — R05.9 COUGH: ICD-10-CM

## 2025-03-17 DIAGNOSIS — J06.9 VIRAL UPPER RESPIRATORY TRACT INFECTION: Primary | ICD-10-CM

## 2025-03-17 LAB — S PYO AG THROAT QL: NEGATIVE

## 2025-03-17 PROCEDURE — 87880 STREP A ASSAY W/OPTIC: CPT | Performed by: NURSE PRACTITIONER

## 2025-03-17 PROCEDURE — 99214 OFFICE O/P EST MOD 30 MIN: CPT | Performed by: NURSE PRACTITIONER

## 2025-03-17 PROCEDURE — 71046 X-RAY EXAM CHEST 2 VIEWS: CPT | Performed by: NURSE PRACTITIONER

## 2025-03-17 RX ORDER — PREDNISONE 20 MG/1
40 TABLET ORAL DAILY
Qty: 10 TABLET | Refills: 0 | Status: SHIPPED | OUTPATIENT
Start: 2025-03-17 | End: 2025-03-22

## 2025-03-17 RX ORDER — BENZONATATE 100 MG/1
100 CAPSULE ORAL 3 TIMES DAILY PRN
Qty: 30 CAPSULE | Refills: 0 | Status: SHIPPED | OUTPATIENT
Start: 2025-03-17 | End: 2025-04-16

## 2025-03-18 ENCOUNTER — NURSE TRIAGE (OUTPATIENT)
Dept: FAMILY MEDICINE CLINIC | Facility: CLINIC | Age: 55
End: 2025-03-18

## 2025-03-18 RX ORDER — AZITHROMYCIN 250 MG/1
TABLET, FILM COATED ORAL
Qty: 6 TABLET | Refills: 0 | Status: SHIPPED | OUTPATIENT
Start: 2025-03-18 | End: 2025-03-23

## 2025-03-18 NOTE — TELEPHONE ENCOUNTER
Message noted. May call in zithromax as requested. Erx sent to listed pharmacy. To follow up for appointment with Dr JHA if not better; Please notify patient

## 2025-03-18 NOTE — TELEPHONE ENCOUNTER
Patient stated that she was seen in urgent care yesterday for her cough. Stated that they heard rhonchi in her lungs but they did not prescribe an antibiotic. They prescribed prednisone which she is not going to take since states possibly has a stomach ulcer and EGD scheduled for 4/4/2025. Taking pantoprazole. Also did not take the benzonatate because it does not help.  Patient is currently working but wanted an antibiotic prescribed. Please advise.     Aware that Dr Hurtado not in the office. Sent to [facility].     3/17/2025 urgent care notes:  Disposition and Plan    Clinical Impression:  1. Viral upper respiratory tract infection    2. Cough    3. Moderate persistent asthma without complication (HCC)        Disposition:  Discharge  3/17/2025  7:53 pm     Follow-up:  Nan Hurtado MD  [address] [place], [place] [id number]  [phone]      Medications Prescribed:  Discharge Medication List as of 3/17/2025  7:59 PM              START taking these medications     Details   predniSONE 20 MG Oral Tab Take 2 tablets (40 mg total) by mouth daily for 5 days., Normal, Disp-10 tablet, R-0       benzonatate 100 MG Oral Cap Take 1 capsule (100 mg total) by mouth 3 (three) times daily as needed for cough., Normal, Disp-30 capsule, R-0                  Electronically signed by Ericka Martinez APRN at 3/17/2025  8:03 PM

## 2025-03-18 NOTE — DISCHARGE INSTRUCTIONS
You can continue to take your albuterol inhaler for wheezing every 4-6 hours while awake. This opens up your lungs but may make you cough more.     I sent over tessalon pearles, prednisone if you choose.. You can add on Mucinex DM for cough.             REFER TO HANDOUT FOR FURTHER DISCHARGE CARE.  TAKE MEDICATIONS AS PRESCRIBED.  FOLLOW UP WITH YOUR REGULAR DOCTOR IN 3-5 DAYS IF NO IMPROVEMENT, SOONER IF NEEDED.  Take your medications exactly as directed.  Even if you feel well you may need daily medicine to keep your asthma well controlled and prevent future attacks.  If you smoke, it is best to stop. If not it is important to avoid second-hand smoke.  Try to keep your windows closed during pollen, mold, and allergy seasons  GO TO THE EMERGENCY DEPARTMENT OR CALL YOUR DOCTOR IMMEDIATELY f any of the following occur:  Increased wheezing or shortness of breath  Need to use your inhalers more often than usual without relief    Fever of 100.4ºF (38ºC) or higher, or as directed by your healthcare provider  Coughing up lots of dark-colored or bloody sputum (mucus)  Chest pain with each breath  Trouble walking or talking because of shortness of breath  If you use a peak flow meter and you are still in the red zone (less than 50 percent) 15 minutes after using inhaler medication  Any worsening symptoms  Or any other concerns. .     Cough, Adult  Coughing is a reflex that clears your throat and airways (respiratory system). It helps heal and protect your lungs. It is normal to cough from time to time. A cough that happens with other symptoms or that lasts a long time may be a sign of a condition that needs treatment. A short-term (acute) cough may only last 2-3 weeks. A long-term (chronic) cough may last 8 or more weeks.    Coughing is often caused by:  Diseases, such as:  An infection of the respiratory system.  Asthma or other heart or lung diseases.  Gastroesophageal reflux. This is when acid comes back up from the  stomach.  Breathing in things that irritate your lungs.  Allergies.  Postnasal drip. This is when mucus runs down the back of your throat.  Smoking.  Some medicines.  Follow these instructions at home:  Medicines    Take over-the-counter and prescription medicines only as told by your health care provider.  Talk with your provider before you take cough medicine (cough suppressants).  Eating and drinking    Do not drink alcohol.  Avoid caffeine.  Drink enough fluid to keep your pee (urine) pale yellow.  Lifestyle    Avoid cigarette smoke.  Do not use any products that contain nicotine or tobacco. These products include cigarettes, chewing tobacco, and vaping devices, such as e-cigarettes. If you need help quitting, ask your provider.  Avoid things that make you cough. These may include perfumes, candles, cleaning products, or campfire smoke.  General instructions    A person holding a cloth over the mouth and nose while coughing.  Watch for any changes to your cough. Tell your provider about them.  Always cover your mouth when you cough.  If the air is dry in your bedroom or home, use a cool mist vaporizer or humidifier.  If your cough is worse at night, try to sleep in a semi-upright position.  Rest as needed.  Contact a health care provider if:  You have new symptoms, or your symptoms get worse.  You cough up pus.  You have a fever that does not go away or a cough that does not get better after 2-3 weeks.  You cannot control your cough with medicine, and you are losing sleep.  You have pain that gets worse or is not helped with medicine.  You lose weight for no clear reason.  You have night sweats.  Get help right away if:  You cough up blood.  You have trouble breathing.  Your heart is beating very fast.

## 2025-03-18 NOTE — ED PROVIDER NOTES
Patient Seen in: [facility]      History     Chief Complaint   Patient presents with    Cough     Stated Complaint: Cough    Subjective:   HPI    54 year old female presents with sore throat, intermittent fevers x 1 week.  No drooling/trismus/submandibular swelling.  Speech clear and intact. No muffled voice, drooling, sialorrhea.   History of asthma, currently well controled on advair.   Denies f/c/n/v/d. No recent sick exposures. Denies recent infections/covid exposures.       Objective:     Past Medical History:    Allergic rhinitis    Seldom / more occurence when rains    Amenorrhea    had not had [id number]    Asthma (HCC)    Essential hypertension    Dx late ’s    Sleep apnea    not compliant with cpap since . doesnt tolerate cpap              Past Surgical History:   Procedure Laterality Date    Colonoscopy  2022    D & c  2006    2 miscarriages      O4-18-03    Also 2/15/2007 another childbirth                Social History     Socioeconomic History    Marital status:    Occupational History     Employer: [employer]    Occupation: Nurse   Tobacco Use    Smoking status: Never    Smokeless tobacco: Never   Vaping Use    Vaping status: Never Used   Substance and Sexual Activity    Alcohol use: Not Currently     Comment: rarely drink .  Only special occasions.    Drug use: Never    Sexual activity: Yes     Partners: Male   Other Topics Concern    Caffeine Concern No    Exercise No    Seat Belt No    Special Diet No    Stress Concern No    Weight Concern No    Blood Transfusions No     Social Drivers of Health     Food Insecurity: No Food Insecurity (10/22/2020)    [facility]    Hunger Vital Sign     Worried About Running Out of Food in the Last Year: Never true     Ran Out of Food in the Last Year: Never true   Transportation Needs: No Transportation Needs (10/22/2020)    Received from [place]  [facility], [facility]    PRAPARE - Transportation     Lack of Transportation (Medical): No     Lack of Transportation (Non-Medical): No              Review of Systems    Positive for stated complaint: Cough  Other systems are as noted in HPI.  Constitutional and vital signs reviewed.      All other systems reviewed and negative except as noted above.    Physical Exam     ED Triage Vitals [03/17/25 1913]   BP (!) 135/96   Pulse 87   Resp 18   Temp 98.5 °F (36.9 °C)   Temp src Oral   SpO2 100 %   O2 Device None (Room air)       Current Vitals:   Vital Signs  BP: (!) 135/96  Pulse: 87  Resp: 18  Temp: 98.5 °F (36.9 °C)  Temp src: Oral    Oxygen Therapy  SpO2: 100 %  O2 Device: None (Room air)        Physical Exam  Vitals and nursing note reviewed.   Constitutional:       General: She is not in acute distress.     Appearance: She is not toxic-appearing.   HENT:      Head: Normocephalic.      Nose: Nose normal. No congestion or rhinorrhea.      Mouth/Throat:      Mouth: Mucous membranes are moist.      Tonsils: No tonsillar exudate or tonsillar abscesses. 1+ on the right. 1+ on the left.   Cardiovascular:      Rate and Rhythm: Normal rate.   Pulmonary:      Effort: Pulmonary effort is normal. No respiratory distress.      Breath sounds: Rhonchi present.   Abdominal:      General: Abdomen is flat.   Musculoskeletal:         General: Normal range of motion.      Cervical back: Normal range of motion.   Skin:     General: Skin is warm and dry.      Capillary Refill: Capillary refill takes less than 2 seconds.   Neurological:      General: No focal deficit present.      Mental Status: She is alert.             ED Course     Labs Reviewed   POCT RAPID STREP - Normal                   MDM             Medical Decision Making  54 year old female p/w sore throat, cough, URI symptoms  8 days.  Well-appearing, well-hydrated on exam. No SBI identified on exam. Likely viral illness, URI.  Strep  negative.    Xray of chest>I have directly viewed this exam, No pna as clinically questioned. Pending rad read.     Xray>XR CHEST PA + LAT CHEST (CPT=71046)    Result Date: 3/17/2025  CONCLUSION:  Stable chest without radiographically evident acute intrathoracic process.    Dictated by (CST): Jonah Jones MD on 3/17/2025 at 7:50 PM     Finalized by (CST): Jonah Jones MD on 3/17/2025 at 7:50 PM          Discussed treating for URI, moderate persistent asthma increasing inhaler use, steroid burst. Pt requesting a Z-pack but discussed that this would be inappropriate given her symptoms and the drug is not appropriate for viral symptoms.     Plan:   - home with supportive care  - tylenol, motrin prn  - f/u with PCP    Physical exam remained stable over serial reexaminations as previously documented. External chart review completed, no recent hospitalizations for the same.     I have discussed with the patient the results of tests, differential diagnosis, and warning signs and symptoms that should prompt immediate return.  The patient understands these instructions and agrees to the follow-up plan provided.  There are no barriers to learning.   Appropriate f/u given.  Patient agrees to return for any concerns/problems/complications.    Differential diagnosis reflecting the complexity of care include: URI, sinusitis, covid, strep pharyngitis, viral pharyngitis, AOM, OME, OE    Comorbidities that add complexity to management include:asthma    External chart review was done and was noted:y    History obtained by an independent source was from:Parent/patient    Discussions of management was done with:na    My independent interpretation of studies of:na    Diagnostic tests and medications considered but not ordered were:none    Social determinants of health that affect care:na    Shared decision making was done by patient, self            Disposition and Plan     Clinical Impression:  1. Viral upper respiratory tract  infection    2. Cough    3. Moderate persistent asthma without complication (HCC)         Disposition:  Discharge  3/17/2025  7:53 pm    Follow-up:  Nan Hurtado MD  [address] [place], [place] [id number]  [phone]                Medications Prescribed:  Discharge Medication List as of 3/17/2025  7:59 PM        START taking these medications    Details   predniSONE 20 MG Oral Tab Take 2 tablets (40 mg total) by mouth daily for 5 days., Normal, Disp-10 tablet, R-0      benzonatate 100 MG Oral Cap Take 1 capsule (100 mg total) by mouth 3 (three) times daily as needed for cough., Normal, Disp-30 capsule, R-0                 Supplementary Documentation:

## 2025-03-18 NOTE — TELEPHONE ENCOUNTER
Left detailed  Voicemail that Zithromax prescription was  sent to [facility] today, follow up with Dr Hurtado if no improvement, to call back our office with questions.. Office phone number provided with office telephone hours.

## 2025-03-24 ENCOUNTER — PATIENT MESSAGE (OUTPATIENT)
Facility: CLINIC | Age: 55
End: 2025-03-24

## 2025-03-24 ENCOUNTER — PATIENT MESSAGE (OUTPATIENT)
Dept: FAMILY MEDICINE CLINIC | Facility: CLINIC | Age: 55
End: 2025-03-24

## 2025-03-25 ENCOUNTER — TELEPHONE (OUTPATIENT)
Facility: CLINIC | Age: 55
End: 2025-03-25

## 2025-03-25 NOTE — TELEPHONE ENCOUNTER
Patient contacted and states she sent Dr. Aguilar a [employer] message to please complete a peer to peer for her upcoming EGD that was denied.  Please see yesterdays [employer] message.  This encounter closed.

## 2025-03-25 NOTE — TELEPHONE ENCOUNTER
Yara AMEZQUITA  [facility] Clinical [employer] (supporting Estevan Aguilar MD)12 hours ago (8:48 PM)       Dr Aguilar . I send a previous email To contact [facility] . Just let you know I initially was  Going Dr. Fonseca for Egd and called to make sure his referral / approval (which he called in) was cancelled. I informed the insurance  that you will do EGD instead.  His referral was cancelled  last week and I found out today [facility] / your EGD denied because  you  need call  physician reviewer for further details.    .   I still have abdominal pain after eating. Hopefully, they approve procedure     thanks Yara

## 2025-03-25 NOTE — TELEPHONE ENCOUNTER
Also see 3/24/2025 [employer] Message regarding concerns for 4/4/2025 Esophagogastroduodenoscopy - has questions.  Please call.  Thank you.

## 2025-04-01 ENCOUNTER — TELEPHONE (OUTPATIENT)
Dept: OBGYN CLINIC | Facility: CLINIC | Age: 55
End: 2025-04-01

## 2025-04-04 ENCOUNTER — ANESTHESIA (OUTPATIENT)
Dept: ENDOSCOPY | Age: 55
End: 2025-04-04
Payer: COMMERCIAL

## 2025-04-04 ENCOUNTER — ANESTHESIA EVENT (OUTPATIENT)
Dept: ENDOSCOPY | Age: 55
End: 2025-04-04
Payer: COMMERCIAL

## 2025-04-04 ENCOUNTER — HOSPITAL ENCOUNTER (OUTPATIENT)
Age: 55
Setting detail: HOSPITAL OUTPATIENT SURGERY
Discharge: HOME | End: 2025-04-04
Attending: INTERNAL MEDICINE | Admitting: INTERNAL MEDICINE
Payer: COMMERCIAL

## 2025-04-04 VITALS
WEIGHT: 155 LBS | BODY MASS INDEX: 26.46 KG/M2 | OXYGEN SATURATION: 97 % | RESPIRATION RATE: 14 BRPM | SYSTOLIC BLOOD PRESSURE: 117 MMHG | HEART RATE: 79 BPM | HEIGHT: 64 IN | DIASTOLIC BLOOD PRESSURE: 78 MMHG

## 2025-04-04 DIAGNOSIS — R10.13 EPIGASTRIC PAIN: ICD-10-CM

## 2025-04-04 DIAGNOSIS — K21.9 GASTROESOPHAGEAL REFLUX DISEASE, UNSPECIFIED WHETHER ESOPHAGITIS PRESENT: ICD-10-CM

## 2025-04-04 PROCEDURE — 88312 SPECIAL STAINS GROUP 1: CPT | Performed by: INTERNAL MEDICINE

## 2025-04-04 PROCEDURE — 43239 EGD BIOPSY SINGLE/MULTIPLE: CPT | Performed by: INTERNAL MEDICINE

## 2025-04-04 PROCEDURE — 88305 TISSUE EXAM BY PATHOLOGIST: CPT | Performed by: INTERNAL MEDICINE

## 2025-04-04 PROCEDURE — 99070 SPECIAL SUPPLIES PHYS/QHP: CPT | Performed by: INTERNAL MEDICINE

## 2025-04-04 RX ORDER — LIDOCAINE HYDROCHLORIDE 10 MG/ML
INJECTION, SOLUTION EPIDURAL; INFILTRATION; INTRACAUDAL; PERINEURAL AS NEEDED
Status: DISCONTINUED | OUTPATIENT
Start: 2025-04-04 | End: 2025-04-04 | Stop reason: SURG

## 2025-04-04 RX ORDER — SODIUM CHLORIDE, SODIUM LACTATE, POTASSIUM CHLORIDE, CALCIUM CHLORIDE 600; 310; 30; 20 MG/100ML; MG/100ML; MG/100ML; MG/100ML
INJECTION, SOLUTION INTRAVENOUS CONTINUOUS
Status: DISCONTINUED | OUTPATIENT
Start: 2025-04-04 | End: 2025-04-04

## 2025-04-04 RX ADMIN — LIDOCAINE HYDROCHLORIDE 100 MG: 10 INJECTION, SOLUTION EPIDURAL; INFILTRATION; INTRACAUDAL; PERINEURAL at 11:40:00

## 2025-04-04 RX ADMIN — SODIUM CHLORIDE, SODIUM LACTATE, POTASSIUM CHLORIDE, CALCIUM CHLORIDE: 600; 310; 30; 20 INJECTION, SOLUTION INTRAVENOUS at 11:39:00

## 2025-04-04 NOTE — OPERATIVE REPORT
[facility] Endoscopy Report  Date of procedure-April 4, 2025    Preoperative Diagnosis:  -Epigastric abdominal pain  -GERD    Postoperative Diagnosis:  -Gastritis  -Gastric polyps    Procedure:    Esophagogastroduodenoscopy     Surgeon:  Estevan Aguilar M.D.    Anesthesia:  MAC     Technique:  After informed consent, the patient was placed in the left lateral recumbent position.  An Olympus adult HD gastroscope was inserted into the hypopharynx and advanced under direct vision into the esophagus, stomach and duodenum.  The endoscope was withdrawn to the stomach where retroflexion of the annulus, body, cardia and fundus was performed.  The instrument was straightened, insufflated air and fluid were suctioned and the endoscope was withdrawn.  The procedures were well tolerated without immediate complication.      Findings:  The esophagus was normal.  The GE junction and diaphragmatic impression were at 40 cm.  The stomach distended appropriately with insufflated air.  The mucosa of the stomach shows subtle gastritis/gastric erythema throughout the entire stomach.  Additionally small fundic gland like polyps noted, one of these was removed via cold snare and sent for analysis.  No bleeding was noted the polypectomy site.  The duodenal bulb and post bulbar regions were normal.    Estimated blood loss-insignificant  Specimens-see above    Impression:  -Gastritis  -Gastric polyps    Recommendations:  - Post procedure instructions given  - Follow up on on upper GI biopsies          Estevan Aguilar MD  4/4/2025  2:20 PM

## 2025-04-04 NOTE — H&P
History & Physical Examination    Patient Name: Yara Westfall  MRN: [id number]  CSN: [id number]  YOB: 1970    Diagnosis:   GERD  Epigastric pain       Prescriptions Prior to Admission[1]  Current Facility-Administered Medications   Medication Dose Route Frequency    lactated ringers infusion   Intravenous Continuous       Allergies: Allergies[2]    Past Medical History:    Allergic rhinitis    Seldom / more occurence when rains    Amenorrhea    had not had [id number]    Anxiety state    Asthma (HCC)    Cataract    mild    Disorder of liver    fatty liver    Esophageal reflux    Essential hypertension    Dx late     High blood pressure    Sleep apnea    not compliant with cpap since . doesnt tolerate cpap    Visual impairment    glasses     Past Surgical History:   Procedure Laterality Date    Colonoscopy  2022    D & c      2 miscarriages      O03    Also 2/15/2007 another childbirth     Family History   Problem Relation Age of Onset    Hypertension Mother     Lipids Mother     Asthma Father     Hypertension Father     Lipids Father     Ovarian Cancer Sister     Other (Ovarian tumor) Sister 37        s/p resection, but still being monitored for cancer q 6months    Diabetes Maternal Grandmother     Dementia Maternal Grandfather     Asthma Paternal Grandmother     Cancer Paternal Grandfather         do not  know name. He  young.    Breast Cancer Neg     Colon Cancer Neg     Uterine Cancer Neg      Social History     Tobacco Use    Smoking status: Never    Smokeless tobacco: Never   Substance Use Topics    Alcohol use: Not Currently     Comment: rarely drink .  Only special occasions.       SYSTEM Check if Review is Normal Check if Physical Exam is Normal If not normal, please explain:   HEENT [x ] [ x]    NECK & BACK [x ] [x ]    HEART [x ] [ x]    LUNGS [x ] [ x]    ABDOMEN [x ] [x ]    UROGENITAL [ ] [ ]    EXTREMITIES [x ] [x ]    OTHER        [ x ] I have  discussed the risks and benefits and alternatives with the patient/family.  They understand and agree to proceed with plan of care.  [ x ] I have reviewed the History and Physical done within the last 30 days.  Any changes noted above.    Estevan Aguilar MD  2025  11:33 AM         [1]   Medications Prior to Admission   Medication Sig Dispense Refill Last Dose/Taking    benzonatate 100 MG Oral Cap Take 1 capsule (100 mg total) by mouth 3 (three) times daily as needed for cough. 30 capsule 0 3/30/2025    losartan 50 MG Oral Tab Take 1 tablet (50 mg total) by mouth daily. 90 tablet 0 2025    Zaleplon 5 MG Oral Cap Take 1 capsule (5 mg total) by mouth every evening. 90 capsule 0 4/3/2025    pantoprazole 40 MG Oral Tab EC Take 1 tablet (40 mg total) by mouth 2 (two) times daily before meals. 60 tablet 1 4/3/2025    sucralfate (CARAFATE) 1 g Oral Tab Take 1 tablet (1 g total) by mouth 4 (four) times daily before meals and nightly. 120 tablet 0 3/31/2025    ondansetron (ZOFRAN) 8 MG tablet Take 1 tablet (8 mg total) by mouth every 8 (eight) hours as needed for Nausea. 30 tablet 3 3/30/2025    Continuous Glucose Sensor (FREESTYLE MOIRA 2 SENSOR) Does not apply Misc 1 each every 14 (fourteen) days. 6 each 0 4/3/2025    Continuous Glucose  (FREESTYLE MOIRA 2 READER) Does not apply Device 1 each As Directed. 1 each 0 4/3/2025    albuterol (PROAIR HFA) 108 (90 Base) MCG/ACT Inhalation Aero Soln Inhale 2 puffs into the lungs every 4 (four) hours as needed for Wheezing. 25.5 g 1 4/3/2025    fluticasone-salmeterol 250-50 MCG/ACT Inhalation Aerosol Powder, Breath Activated Inhale 1 puff into the lungs every 12 (twelve) hours. 180 each 1 2025    [] Mirabegron ER 50 MG Oral Tablet 24 Hr Take 1 tablet (50 mg total) by mouth daily. 90 tablet 3 Taking    MAGNESIUM GLYCINATE  mg.   4/3/2025    cetirizine 10 MG Oral Tab Take 1 tablet (10 mg total) by mouth daily as needed.   3/30/2025    melatonin 3 MG  Oral Tab 1 tablet (3 mg total) nightly as needed.   4/3/2025    omega-3 fatty acids 1000 MG Oral Cap Take by mouth daily.   4/3/2025    Ergocalciferol (VITAMIN D2) 50 MCG (2000 UT) Oral Tab  (Patient not taking: Reported on 2/26/2025)   More than a month   [2] No Known Allergies

## 2025-04-04 NOTE — DISCHARGE INSTRUCTIONS
Home Care Instructions for Gastroscopy with Sedation    Diet:  - Resume your regular diet as tolerated unless otherwise instructed.  - Start with light meals to minimize bloating.  - Do not drink alcohol today.    Medication:  - If you have questions about resuming your normal medications, please contact your Primary Care Physician.    Activities:  - Take it easy today. Do not return to work today.  - Do not drive today.  - Do not operate any machinery today (including kitchen equipment).        Gastroscopy:  - You may have a sore throat for 2-3 days following the exam. This is normal. Gargling with warm salt water (1/2 tsp salt to 1 glass warm water) or using throat lozenges will help.  - If you experience any sharp pain in your neck, abdomen or chest, vomiting of blood, oral temperature over 100 degrees Fahrenheit, light-headedness or dizziness, or any other problems, contact your doctor.    **If unable to reach your doctor, please go to the [facility] Emergency Room**    - Your referring physician will receive a full report of your examination.  - If you do not hear from your doctor's office within two weeks of your biopsy, please call them for your results.    You may be able to see your laboratory results in beatlab between 4 and 7 business days.  In some cases, your physician may not have viewed the results before they are released to beatlab.  If you have questions regarding your results contact the physician who ordered the test/exam by phone or via beatlab by choosing \"Ask a Medical Question.\"

## 2025-04-04 NOTE — ANESTHESIA PREPROCEDURE EVALUATION
Anesthesia PreOp Note    HPI:     Yara Westfall is a 54 year old female who presents for preoperative consultation requested by: Estevan Aguilar MD    Date of Surgery: 2025    Procedure(s):  ESOPHAGOGASTRODUODENOSCOPY  Indication: Epigastric pain / Gastroesophageal reflux disease, unspecified whether esophagitis present    Relevant Problems   No relevant active problems       NPO:                         History Review:  Patient Active Problem List    Diagnosis Date Noted    Urinary symptom or sign 05/10/2024    Menopause 2024    Women's annual routine gynecological examination 2023    Screening for cervical cancer 2023    Breast cancer screening by mammogram 2023    Menopausal vaginal dryness 2023    Decreased libido 2023    Anxiety 2023    Hypercholesteremia 2023    Seasonal allergies 2023    Other insomnia 2023    Nocturia 2023    Essential hypertension 2023    Sleep apnea 2023    Asthma (HCC) 2023    Thyroid function test abnormal 2009    Myopia 2008    Open angle with borderline findings, low risk 2008    Tear film insufficiency, unspecified 2008       Past Medical History:    Allergic rhinitis    Seldom / more occurence when rains    Amenorrhea    had not had [id number]    Anxiety state    Asthma (HCC)    Cataract    mild    Disorder of liver    fatty liver    Esophageal reflux    Essential hypertension    Dx late ’s    High blood pressure    Sleep apnea    not compliant with cpap since . doesnt tolerate cpap    Visual impairment    glasses       Past Surgical History:   Procedure Laterality Date    Colonoscopy  2022    D & c  2006    2 miscarriages      [id number]    Also 2/15/2007 another childbirth       Prescriptions Prior to Admission[1]  Current Medications and Prescriptions Ordered in Epic[2]    Allergies[3]    Family History   Problem Relation Age of Onset     Hypertension Mother     Lipids Mother     Asthma Father     Hypertension Father     Lipids Father     Ovarian Cancer Sister     Other (Ovarian tumor) Sister 37        s/p resection, but still being monitored for cancer q 6months    Diabetes Maternal Grandmother     Dementia Maternal Grandfather     Asthma Paternal Grandmother     Cancer Paternal Grandfather         do not  know name. He  young.    Breast Cancer Neg     Colon Cancer Neg     Uterine Cancer Neg      Social History     Socioeconomic History    Marital status:    Occupational History     Employer: [employer]    Occupation: Nurse   Tobacco Use    Smoking status: Never    Smokeless tobacco: Never   Vaping Use    Vaping status: Never Used   Substance and Sexual Activity    Alcohol use: Not Currently     Comment: rarely drink .  Only special occasions.    Drug use: Never    Sexual activity: Yes     Partners: Male   Other Topics Concern    Caffeine Concern No    Exercise No    Seat Belt No    Special Diet No    Stress Concern No    Weight Concern No    Blood Transfusions No       Available pre-op labs reviewed.  Lab Results   Component Value Date    WBC 8.8 2025    RBC 4.81 2025    HGB 14.5 2025    HCT 41.7 2025    MCV 86.7 2025    MCH 30.1 2025    MCHC 34.8 2025    RDW 11.2 2025    .0 2025     Lab Results   Component Value Date     2025    K 3.7 2025     2025    CO2 27.0 2025    BUN 10 2025    CREATSERUM 0.77 2025     (H) 2025    CA 8.9 2025          Vital Signs:  Body mass index is 26.61 kg/m².   height is 1.626 m (5' 4\") and weight is 70.3 kg (155 lb).   Vitals:    25 1316   Weight: 70.3 kg (155 lb)   Height: 1.626 m (5' 4\")        Anesthesia Evaluation     Patient summary reviewed and Nursing notes reviewed    No history of anesthetic complications   Airway   Mallampati: I  TM distance: >3 FB  Neck ROM:  full  Dental - Dentition appears grossly intact     Pulmonary - normal exam   (+) asthma, sleep apnea  Cardiovascular - normal exam  (+) hypertension    Neuro/Psych    (+)  anxiety/panic attacks,        GI/Hepatic/Renal    (+) GERD    Endo/Other - negative ROS   Abdominal                  Anesthesia Plan:   ASA:  2  Plan:   MAC  Informed Consent Plan and Risks Discussed With:  Patient and spouse      I have informed Yara Westfall and/or legal guardian or family member of the nature of the anesthetic plan, benefits, risks including possible dental damage if relevant, major complications, and any alternative forms of anesthetic management.   All of the patient's questions were answered to the best of my ability. The patient desires the anesthetic management as planned.  Rosendo Hernandez MD  2025 10:40 AM  Present on Admission:  **None**           [1]   Medications Prior to Admission   Medication Sig Dispense Refill Last Dose/Taking    losartan 50 MG Oral Tab Take 1 tablet (50 mg total) by mouth daily. 90 tablet 0 Taking    Zaleplon 5 MG Oral Cap Take 1 capsule (5 mg total) by mouth every evening. 90 capsule 0     pantoprazole 40 MG Oral Tab EC Take 1 tablet (40 mg total) by mouth 2 (two) times daily before meals. 60 tablet 1 Taking    sucralfate (CARAFATE) 1 g Oral Tab Take 1 tablet (1 g total) by mouth 4 (four) times daily before meals and nightly. 120 tablet 0 Taking    ondansetron (ZOFRAN) 8 MG tablet Take 1 tablet (8 mg total) by mouth every 8 (eight) hours as needed for Nausea. 30 tablet 3 Taking As Needed    albuterol (PROAIR HFA) 108 (90 Base) MCG/ACT Inhalation Aero Soln Inhale 2 puffs into the lungs every 4 (four) hours as needed for Wheezing. 25.5 g 1 Taking As Needed    fluticasone-salmeterol 250-50 MCG/ACT Inhalation Aerosol Powder, Breath Activated Inhale 1 puff into the lungs every 12 (twelve) hours. 180 each 1 Taking    [] Mirabegron ER 50 MG Oral Tablet 24 Hr Take 1 tablet (50 mg  total) by mouth daily. 90 tablet 3 Taking    MAGNESIUM GLYCINATE  mg.   Taking    cetirizine 10 MG Oral Tab Take 1 tablet (10 mg total) by mouth daily as needed.   Taking As Needed    melatonin 3 MG Oral Tab 1 tablet (3 mg total) nightly as needed.   Taking As Needed    omega-3 fatty acids 1000 MG Oral Cap Take by mouth daily.   Taking    benzonatate 100 MG Oral Cap Take 1 capsule (100 mg total) by mouth 3 (three) times daily as needed for cough. 30 capsule 0     Continuous Glucose Sensor (FREESTYLE MOIRA 2 SENSOR) Does not apply Misc 1 each every 14 (fourteen) days. 6 each 0     Continuous Glucose  (FREESTYLE MOIRA 2 READER) Does not apply Device 1 each As Directed. 1 each 0     Ergocalciferol (VITAMIN D2) 50 MCG (2000 UT) Oral Tab  (Patient not taking: Reported on 2/26/2025)   More than a month   [2]   Current Facility-Administered Medications Ordered in Epic   Medication Dose Route Frequency Provider Last Rate Last Admin    lactated ringers infusion   Intravenous Continuous Estevan Aguilar MD         No current [facility]-ordered outpatient medications on file.   [3] No Known Allergies

## 2025-04-04 NOTE — ANESTHESIA POSTPROCEDURE EVALUATION
Patient: Yara Wesftall    Procedure Summary       Date: 04/04/25 Room / Location: [facility] ENDOSCOPY 01 / [facility] ENDO    Anesthesia Start: 1139 Anesthesia Stop: 1151    Procedure: ESOPHAGOGASTRODUODENOSCOPY with biopsy Diagnosis:       Epigastric pain      Gastroesophageal reflux disease, unspecified whether esophagitis present      (Gastritis, Gastric Polyp)    Surgeons: Estevan Aguilar MD Anesthesiologist: Rosendo Hernandez MD    Anesthesia Type: MAC ASA Status: 2            Anesthesia Type: MAC    Vitals Value Taken Time   /80 04/04/25 1152   Temp  04/04/25 1152   Pulse 85 04/04/25 1152   Resp 18 04/04/25 1152   SpO2 98 04/04/25 1152       [facility] AN Post Evaluation:   Patient Evaluated in PACU  Level of Consciousness: sleepy but conscious  Pain Management: adequate  Airway Patency:patent  Yes    Nausea/Vomiting: none  Cardiovascular Status: acceptable  Respiratory Status: acceptable and room air  Postoperative Hydration acceptable      Rosendo Hernandez MD  4/4/2025 11:52 AM

## 2025-04-10 ENCOUNTER — TELEPHONE (OUTPATIENT)
Facility: CLINIC | Age: 55
End: 2025-04-10

## 2025-04-10 NOTE — TELEPHONE ENCOUNTER
Patient viewed below result note and has an appointment scheduled.  Seen by patient Yara Westfall on 4/9/2025  1:01 PM     Your Appointments        Wednesday July 02, 2025 10:30 AM  Follow Up Visit with Estevan Aguilar MD  [facility] ([facility] [address]  [place] [address] [phone]   Contact your primary care provider if your insurance requires a referral.    Please arrive 15 minutes prior to your scheduled appointment. Be sure to bring your current Insurance card, Photo ID, and medication bottles or a list of your current medications.      A 24 hour notice is required to cancel any appointment or you may be charged a $40 No Show Fee.     Important: 24 hour notice is required to cancel any appointment or you may be charged a $40 No Show Fee. Please notify your physician office.

## 2025-04-10 NOTE — TELEPHONE ENCOUNTER
----- Message from Estevan Aguilar sent at 4/9/2025 12:46 PM CDT -----  Yara,   I wanted to get back to you with your recent EGD results.  The scope showed irritation of the stomach ( gastritis) but no ulcer, samples taken were negative for infection ( no H pylori).  Stomach   polyps noted and biopsies /removed and found to be benign.     Please follow up in the next 3 months - work on avoiding food triggers, no spicy foods. Avoid NSAIDs.   Dr. Aguilar    ----- Message -----  From: Lab, Background User  Sent: 4/7/2025   9:31 AM CDT  To: Estevan Aguilar MD

## 2025-05-07 ENCOUNTER — TELEPHONE (OUTPATIENT)
Dept: FAMILY MEDICINE CLINIC | Facility: CLINIC | Age: 55
End: 2025-05-07

## 2025-05-07 NOTE — TELEPHONE ENCOUNTER
Patient Review of Clinical Information        Problems    The patient or proxy has not reviewed this information.               Medications    The patient or proxy has not reviewed this information, and there are updates pending:       Requested Medication Removals Start Date Reported By  Comments    Continuous Glucose Sensor (FREESTYLE MOIRA 2 SENSOR) Does not apply Misc 10/23/2024 Yara Westfall                        Allergies    The patient or proxy has not reviewed this information.

## 2025-05-14 ENCOUNTER — OFFICE VISIT (OUTPATIENT)
Dept: FAMILY MEDICINE CLINIC | Facility: CLINIC | Age: 55
End: 2025-05-14
Payer: COMMERCIAL

## 2025-05-14 VITALS
RESPIRATION RATE: 18 BRPM | WEIGHT: 155 LBS | TEMPERATURE: 99 F | BODY MASS INDEX: 27.12 KG/M2 | OXYGEN SATURATION: 97 % | SYSTOLIC BLOOD PRESSURE: 118 MMHG | HEIGHT: 63.2 IN | DIASTOLIC BLOOD PRESSURE: 79 MMHG | HEART RATE: 93 BPM

## 2025-05-14 DIAGNOSIS — R35.0 URINARY FREQUENCY: ICD-10-CM

## 2025-05-14 DIAGNOSIS — G47.00 INSOMNIA, UNSPECIFIED TYPE: ICD-10-CM

## 2025-05-14 DIAGNOSIS — Z12.31 ENCOUNTER FOR SCREENING MAMMOGRAM FOR MALIGNANT NEOPLASM OF BREAST: ICD-10-CM

## 2025-05-14 DIAGNOSIS — R10.13 EPIGASTRIC PAIN: ICD-10-CM

## 2025-05-14 DIAGNOSIS — J45.40 MODERATE PERSISTENT ASTHMA WITHOUT COMPLICATION (HCC): ICD-10-CM

## 2025-05-14 DIAGNOSIS — Z00.00 WELL ADULT EXAM: Primary | ICD-10-CM

## 2025-05-14 DIAGNOSIS — I10 ESSENTIAL HYPERTENSION: ICD-10-CM

## 2025-05-14 DIAGNOSIS — K59.00 CONSTIPATION, UNSPECIFIED CONSTIPATION TYPE: ICD-10-CM

## 2025-05-14 DIAGNOSIS — Z78.0 MENOPAUSE: ICD-10-CM

## 2025-05-14 PROCEDURE — 99213 OFFICE O/P EST LOW 20 MIN: CPT | Performed by: STUDENT IN AN ORGANIZED HEALTH CARE EDUCATION/TRAINING PROGRAM

## 2025-05-14 PROCEDURE — 99396 PREV VISIT EST AGE 40-64: CPT | Performed by: STUDENT IN AN ORGANIZED HEALTH CARE EDUCATION/TRAINING PROGRAM

## 2025-05-14 RX ORDER — LOSARTAN POTASSIUM 50 MG/1
50 TABLET ORAL DAILY
Qty: 90 TABLET | Refills: 3 | Status: SHIPPED | OUTPATIENT
Start: 2025-05-14 | End: 2025-05-14

## 2025-05-14 RX ORDER — MONTELUKAST SODIUM 10 MG/1
10 TABLET ORAL NIGHTLY
Qty: 30 TABLET | Refills: 11 | Status: SHIPPED | OUTPATIENT
Start: 2025-05-14 | End: 2026-05-14

## 2025-05-14 RX ORDER — LOSARTAN POTASSIUM 50 MG/1
50 TABLET ORAL DAILY
Qty: 90 TABLET | Refills: 0 | Status: CANCELLED | OUTPATIENT
Start: 2025-05-14

## 2025-05-14 RX ORDER — MIRABEGRON 50 MG/1
50 TABLET, FILM COATED, EXTENDED RELEASE ORAL DAILY
Qty: 90 TABLET | Refills: 3 | Status: SHIPPED | OUTPATIENT
Start: 2025-05-14

## 2025-05-14 RX ORDER — DOCUSATE SODIUM 100 MG/1
100 CAPSULE, LIQUID FILLED ORAL 2 TIMES DAILY
Qty: 180 CAPSULE | Refills: 2 | Status: SHIPPED | OUTPATIENT
Start: 2025-05-14

## 2025-05-14 RX ORDER — ZALEPLON 5 MG/1
5 CAPSULE ORAL EVERY EVENING
Qty: 30 CAPSULE | Refills: 5 | Status: SHIPPED | OUTPATIENT
Start: 2025-05-14

## 2025-05-14 RX ORDER — FLUTICASONE PROPIONATE AND SALMETEROL 250; 50 UG/1; UG/1
1 POWDER RESPIRATORY (INHALATION) EVERY 12 HOURS SCHEDULED
Qty: 180 EACH | Refills: 1 | Status: CANCELLED | OUTPATIENT
Start: 2025-05-14

## 2025-05-14 RX ORDER — LOSARTAN POTASSIUM 50 MG/1
50 TABLET ORAL DAILY
Qty: 90 TABLET | Refills: 3 | Status: SHIPPED | OUTPATIENT
Start: 2025-05-14

## 2025-05-14 RX ORDER — ZALEPLON 5 MG/1
5 CAPSULE ORAL EVERY EVENING
Qty: 90 CAPSULE | Refills: 0 | Status: CANCELLED | OUTPATIENT
Start: 2025-05-14

## 2025-05-14 RX ORDER — ZALEPLON 5 MG/1
5 CAPSULE ORAL EVERY EVENING
Qty: 90 CAPSULE | Refills: 1 | Status: SHIPPED | OUTPATIENT
Start: 2025-05-14 | End: 2025-05-14

## 2025-05-14 RX ORDER — FLUTICASONE PROPIONATE AND SALMETEROL 250; 50 UG/1; UG/1
1 POWDER RESPIRATORY (INHALATION) EVERY 12 HOURS SCHEDULED
Qty: 180 EACH | Refills: 1 | Status: SHIPPED | OUTPATIENT
Start: 2025-05-14

## 2025-05-14 RX ORDER — PANTOPRAZOLE SODIUM 40 MG/1
40 TABLET, DELAYED RELEASE ORAL
Qty: 180 TABLET | Refills: 3 | Status: SHIPPED | OUTPATIENT
Start: 2025-05-14

## 2025-05-14 RX ORDER — ALBUTEROL SULFATE 90 UG/1
2 INHALANT RESPIRATORY (INHALATION) EVERY 4 HOURS PRN
Qty: 25.5 G | Refills: 1 | Status: SHIPPED | OUTPATIENT
Start: 2025-05-14

## 2025-05-14 RX ORDER — ALBUTEROL SULFATE 90 UG/1
2 INHALANT RESPIRATORY (INHALATION) EVERY 4 HOURS PRN
Qty: 25.5 G | Refills: 1 | Status: CANCELLED | OUTPATIENT
Start: 2025-05-14

## 2025-05-14 RX ORDER — MIRABEGRON 50 MG/1
50 TABLET, FILM COATED, EXTENDED RELEASE ORAL DAILY
COMMUNITY
Start: 2025-03-17 | End: 2025-05-14

## 2025-05-14 NOTE — TELEPHONE ENCOUNTER
Please review: medication fails/has no protocol attached.    Dr. Hurtado - please advise zaleplon was sent to [facility] today 5/14/25.   - Patient is requesting this prescription be sent to the [facility] in [place] instead.     - Changed qty to #30 with 5 additional refills instead of #90 + 1 additional refill. Per patient, insurance states patient can only fill every 30 days.    Zaleplon prescription was canceled at the [facility]    Future Appointments   Date Time Provider Department Center   7/2/2025 12:15 PM Nan Hurtado MD [facility]

## 2025-05-14 NOTE — PROGRESS NOTES
Subjective:   Yara Westfall is a 54 year old female who presents for Physical     Pt presenting for routine physical exam. Denies any acute issues or recent illnesses. No significant chronic medical problems. Past medical/surgical history, family history, and social history were reviewed.     H/o gastritis  Last EGD April 2025  Has been taking daily PPI  Notes recent nausea/vomiting after tacos    H/o anxiety  Restarted effexor dosing -- 75mg daily  Denies SH/SI/HI    H/o constipation  Some improvement with Bisacodyl but still notes stool balls  No fiber supplementation  Denies abd pain  Hydrating well    Recent Dx cataracts      History/Other:    Chief Complaint Reviewed and Verified  No Further Nursing Notes to   Review  Allergies Reviewed  Medications Reviewed         Tobacco:  She has never smoked tobacco.    Current Medications[1]    Review of Systems:  Review of Systems  A comprehensive 10 point review of systems was completed.  Pertinent positives and negatives noted in the the HPI.    Objective:   /79 (BP Location: Left arm, Patient Position: Sitting, Cuff Size: adult)   Pulse 93   Temp 98.6 °F (37 °C) (Temporal)   Resp 18   Ht 5' 3.2\" (1.605 m)   Wt 155 lb (70.3 kg)   LMP 04/12/2021 (Approximate)   SpO2 97%   BMI 27.28 kg/m²  Estimated body mass index is 27.28 kg/m² as calculated from the following:    Height as of this encounter: 5' 3.2\" (1.605 m).    Weight as of this encounter: 155 lb (70.3 kg).  Physical Exam  Vitals reviewed.   Constitutional:       General: She is not in acute distress.     Appearance: She is well-developed.   HENT:      Head: Normocephalic.      Right Ear: Tympanic membrane, ear canal and external ear normal.      Left Ear: Tympanic membrane, ear canal and external ear normal.      Nose: Nose normal.      Mouth/Throat:      Mouth: Mucous membranes are moist.      Pharynx: No oropharyngeal exudate.   Eyes:      Conjunctiva/sclera: Conjunctivae normal.    Cardiovascular:      Rate and Rhythm: Normal rate and regular rhythm.      Heart sounds: Normal heart sounds.   Pulmonary:      Effort: Pulmonary effort is normal.      Breath sounds: Normal breath sounds.   Abdominal:      General: Bowel sounds are normal.      Palpations: Abdomen is soft.      Tenderness: There is no abdominal tenderness. There is no guarding.   Musculoskeletal:      Cervical back: Normal range of motion. No tenderness.      Right lower leg: No edema.      Left lower leg: No edema.   Lymphadenopathy:      Cervical: No cervical adenopathy.   Skin:     General: Skin is warm and dry.   Neurological:      Mental Status: She is alert and oriented to person, place, and time.      Deep Tendon Reflexes: Reflexes are normal and symmetric.       Assessment & Plan:   1. Well adult exam (Primary)  Discussed preventative screenings  - Pap 9/2023  - mammogram ordered  - Cscope 1/2022, 10yr recall  - will check labs as below  - encouraged to continue diet/exercise for overall wellness  - follow-up with eye doctor annually  - follow-up with dentist every 6 months  - return yearly for physicals  - annual flu shot  - tetanus booster every 10yrs  - TSH W Reflex To Free T4 [E]; Future  - Lipid Panel; Future  - Vitamin D [E]; Future    2. Constipation, unspecified constipation type  - increased hydration as tolerated, continued exercise as able  - to increase dietary fiber sources  - will start Miralax fiber supplementation  - can use Colace BID  - continue Bisacodyl  - consider GI referral  - docusate sodium (COLACE) 100 MG Oral Cap; Take 1 capsule (100 mg total) by mouth 2 (two) times daily.  Dispense: 180 capsule; Refill: 2    3. Moderate persistent asthma without complication (HCC)  Discussed role of Singulair trial  Continue regimen  - discussed red flags for urgent reevaluation  - montelukast 10 MG Oral Tab; Take 1 tablet (10 mg total) by mouth nightly.  Dispense: 30 tablet; Refill: 11  - albuterol (PROAIR  HFA) 108 (90 Base) MCG/ACT Inhalation Aero Soln; Inhale 2 puffs into the lungs every 4 (four) hours as needed for Wheezing.  Dispense: 25.5 g; Refill: 1  - fluticasone-salmeterol 250-50 MCG/ACT Inhalation Aerosol Powder, Breath Activated; Inhale 1 puff into the lungs every 12 (twelve) hours.  Dispense: 180 each; Refill: 1    4. Urinary frequency  Stable, refilled  - MYRBETRIQ 50 MG Oral Tablet 24 Hr; Take 1 tablet (50 mg total) by mouth daily.  Dispense: 90 tablet; Refill: 3    5. Menopause  Discussed treatment options, including risks/benefits of Combipatch  Will trial and monitor response  - Estradiol-Norethindrone Acet 0.05-0.14 MG/DAY Transdermal Patch Biweekly; Place 1 patch onto the skin twice a week.  Dispense: 8 each; Refill: 2    6. Essential hypertension  In-office BP stable, pt otherwise asymptomatic  - discussed benefits of DASH diet and daily activity  - continue BP monitoring  - continue daily medication  - will check labwork  - advised to call if BP is persistently elevated    7. Epigastric pain  Recent EGD reviewed  - discussed dietary modifications including avoiding caffeine, sodas, NSAID use, EtOH, and late night eating  - to increase water intake as able  - continue PPI dosing  - to call with any worsened symptoms  - pantoprazole 40 MG Oral Tab EC; Take 1 tablet (40 mg total) by mouth 2 (two) times daily before meals.  Dispense: 180 tablet; Refill: 3    8. Insomnia, unspecified type  Stable, refilled    9. Encounter for screening mammogram for malignant neoplasm of breast  - [facility] 2D+3D SCREENING BILAT (CPT=77067/77411); Future    Follow-up in 4-8 weeks for surveillance. Pt verbalized understanding and agrees with plan.        No follow-ups on file.    Nan Hurtado MD, 5/14/2025, 1:51 PM          [1]   Current Outpatient Medications   Medication Sig Dispense Refill    docusate sodium (COLACE) 100 MG Oral Cap Take 1 capsule (100 mg total) by mouth 2 (two) times daily. 180 capsule 2     montelukast 10 MG Oral Tab Take 1 tablet (10 mg total) by mouth nightly. 30 tablet 11    MYRBETRIQ 50 MG Oral Tablet 24 Hr Take 1 tablet (50 mg total) by mouth daily. 90 tablet 3    [START ON 5/15/2025] Estradiol-Norethindrone Acet 0.05-0.14 MG/DAY Transdermal Patch Biweekly Place 1 patch onto the skin twice a week. 8 each 2    albuterol (PROAIR HFA) 108 (90 Base) MCG/ACT Inhalation Aero Soln Inhale 2 puffs into the lungs every 4 (four) hours as needed for Wheezing. 25.5 g 1    pantoprazole 40 MG Oral Tab EC Take 1 tablet (40 mg total) by mouth 2 (two) times daily before meals. 180 tablet 3    fluticasone-salmeterol 250-50 MCG/ACT Inhalation Aerosol Powder, Breath Activated Inhale 1 puff into the lungs every 12 (twelve) hours. 180 each 1    losartan 50 MG Oral Tab Take 1 tablet (50 mg total) by mouth daily. 90 tablet 3    ondansetron (ZOFRAN) 8 MG tablet Take 1 tablet (8 mg total) by mouth every 8 (eight) hours as needed for Nausea. 30 tablet 3    Ergocalciferol (VITAMIN D2) 50 MCG (2000 UT) Oral Tab       MAGNESIUM GLYCINATE  mg.      cetirizine 10 MG Oral Tab Take 1 tablet (10 mg total) by mouth daily as needed.      melatonin 3 MG Oral Tab 1 tablet (3 mg total) nightly as needed.      omega-3 fatty acids 1000 MG Oral Cap Take by mouth daily.      Zaleplon 5 MG Oral Cap Take 1 capsule (5 mg total) by mouth every evening. 30 capsule 5    Continuous Glucose  (FREESTYLE MOIRA 2 READER) Does not apply Device 1 each As Directed. (Patient not taking: Reported on 5/14/2025) 1 each 0

## 2025-05-28 ENCOUNTER — HOSPITAL ENCOUNTER (OUTPATIENT)
Dept: ULTRASOUND IMAGING | Facility: HOSPITAL | Age: 55
Discharge: HOME | End: 2025-05-28
Attending: INTERNAL MEDICINE
Payer: COMMERCIAL

## 2025-05-28 DIAGNOSIS — K76.0 FATTY LIVER: ICD-10-CM

## 2025-05-28 PROCEDURE — 76705 ECHO EXAM OF ABDOMEN: CPT | Performed by: INTERNAL MEDICINE

## 2025-05-28 PROCEDURE — 76981 USE PARENCHYMA: CPT | Performed by: INTERNAL MEDICINE

## 2025-06-20 ENCOUNTER — LAB ENCOUNTER (OUTPATIENT)
Dept: LAB | Age: 55
End: 2025-06-20
Attending: STUDENT IN AN ORGANIZED HEALTH CARE EDUCATION/TRAINING PROGRAM
Payer: COMMERCIAL

## 2025-06-20 DIAGNOSIS — Z00.00 WELL ADULT EXAM: ICD-10-CM

## 2025-06-20 LAB
CHOLEST SERPL-MCNC: 213 MG/DL (ref ?–200)
FASTING PATIENT LIPID ANSWER: YES
HDLC SERPL-MCNC: 46 MG/DL (ref 40–59)
LDLC SERPL CALC-MCNC: 132 MG/DL (ref ?–100)
NONHDLC SERPL-MCNC: 167 MG/DL (ref ?–130)
TRIGL SERPL-MCNC: 195 MG/DL (ref 30–149)
TSI SER-ACNC: 0.94 UIU/ML (ref 0.55–4.78)
VIT D+METAB SERPL-MCNC: 47 NG/ML (ref 30–100)
VLDLC SERPL CALC-MCNC: 36 MG/DL (ref 0–30)

## 2025-06-20 PROCEDURE — 82306 VITAMIN D 25 HYDROXY: CPT

## 2025-06-20 PROCEDURE — 80061 LIPID PANEL: CPT

## 2025-06-20 PROCEDURE — 84443 ASSAY THYROID STIM HORMONE: CPT

## 2025-06-20 PROCEDURE — 36415 COLL VENOUS BLD VENIPUNCTURE: CPT

## 2025-07-17 ENCOUNTER — TELEPHONE (OUTPATIENT)
Dept: FAMILY MEDICINE CLINIC | Facility: CLINIC | Age: 55
End: 2025-07-17

## 2025-07-17 NOTE — TELEPHONE ENCOUNTER
Venlafaxine HCl ER 37.5 MG Oral Tablet 24 Hr       Please contact the insurance company to a PA on behalf of the patient and fax to [phone].

## 2025-07-18 ENCOUNTER — PATIENT MESSAGE (OUTPATIENT)
Dept: FAMILY MEDICINE CLINIC | Facility: CLINIC | Age: 55
End: 2025-07-18

## 2025-07-18 NOTE — TELEPHONE ENCOUNTER
Patient states no need to process Prior Authorization as she will pay out of pocket.  Patient states she needs this medication this weekend and cannot wait for Prior Authorization to process.  Dispensed 7/16/25

## 2025-07-18 NOTE — TELEPHONE ENCOUNTER
Patient states no need to do Prior Authorization for venlafexine. Patient will pay out of pocket.  Patient will contact [facility] for this.  Informed Prior Authorization has not been done yet. See Prior Authorization encounter.  Verbalized understanding.

## 2025-07-28 DIAGNOSIS — F41.9 ANXIETY: ICD-10-CM

## 2025-07-30 ENCOUNTER — OFFICE VISIT (OUTPATIENT)
Dept: PHYSICAL THERAPY | Age: 55
End: 2025-07-30
Attending: STUDENT IN AN ORGANIZED HEALTH CARE EDUCATION/TRAINING PROGRAM
Payer: COMMERCIAL

## 2025-07-30 ENCOUNTER — NURSE TRIAGE (OUTPATIENT)
Dept: FAMILY MEDICINE CLINIC | Facility: CLINIC | Age: 55
End: 2025-07-30

## 2025-07-30 ENCOUNTER — HOSPITAL ENCOUNTER (OUTPATIENT)
Dept: ULTRASOUND IMAGING | Age: 55
Discharge: HOME | End: 2025-07-30
Attending: OBSTETRICS & GYNECOLOGY

## 2025-07-30 DIAGNOSIS — N95.0 POSTMENOPAUSAL BLEEDING: ICD-10-CM

## 2025-07-30 PROCEDURE — 76856 US EXAM PELVIC COMPLETE: CPT | Performed by: OBSTETRICS & GYNECOLOGY

## 2025-07-30 PROCEDURE — 97162 PT EVAL MOD COMPLEX 30 MIN: CPT | Performed by: PHYSICAL THERAPIST

## 2025-07-30 PROCEDURE — 76830 TRANSVAGINAL US NON-OB: CPT | Performed by: OBSTETRICS & GYNECOLOGY

## 2025-07-30 PROCEDURE — 97110 THERAPEUTIC EXERCISES: CPT | Performed by: PHYSICAL THERAPIST

## 2025-07-31 RX ORDER — VENLAFAXINE HYDROCHLORIDE 37.5 MG/1
37.5 TABLET, EXTENDED RELEASE ORAL DAILY
Qty: 90 TABLET | Refills: 3 | Status: SHIPPED | OUTPATIENT
Start: 2025-07-31

## 2025-08-06 ENCOUNTER — OFFICE VISIT (OUTPATIENT)
Dept: PHYSICAL THERAPY | Age: 55
End: 2025-08-06
Attending: STUDENT IN AN ORGANIZED HEALTH CARE EDUCATION/TRAINING PROGRAM

## 2025-08-06 PROCEDURE — 97110 THERAPEUTIC EXERCISES: CPT | Performed by: PHYSICAL THERAPIST

## 2025-08-20 ENCOUNTER — OFFICE VISIT (OUTPATIENT)
Dept: PHYSICAL THERAPY | Age: 55
End: 2025-08-20
Attending: STUDENT IN AN ORGANIZED HEALTH CARE EDUCATION/TRAINING PROGRAM

## 2025-08-20 PROCEDURE — 97110 THERAPEUTIC EXERCISES: CPT | Performed by: PHYSICAL THERAPIST

## 2025-08-22 ENCOUNTER — APPOINTMENT (OUTPATIENT)
Dept: PHYSICAL THERAPY | Age: 55
End: 2025-08-22
Attending: STUDENT IN AN ORGANIZED HEALTH CARE EDUCATION/TRAINING PROGRAM

## 2025-08-25 DIAGNOSIS — J45.40 MODERATE PERSISTENT ASTHMA WITHOUT COMPLICATION (HCC): ICD-10-CM

## 2025-08-27 ENCOUNTER — OFFICE VISIT (OUTPATIENT)
Dept: PHYSICAL THERAPY | Age: 55
End: 2025-08-27
Attending: STUDENT IN AN ORGANIZED HEALTH CARE EDUCATION/TRAINING PROGRAM

## 2025-08-27 PROCEDURE — 97110 THERAPEUTIC EXERCISES: CPT | Performed by: PHYSICAL THERAPIST

## 2025-08-29 RX ORDER — MONTELUKAST SODIUM 10 MG/1
10 TABLET ORAL NIGHTLY
Qty: 90 TABLET | Refills: 2 | Status: SHIPPED | OUTPATIENT
Start: 2025-08-29 | End: 2026-05-26

## (undated) DEVICE — MEDI-VAC NON-CONDUCTIVE SUCTION TUBING: Brand: CARDINAL HEALTH

## (undated) DEVICE — LASSO POLYPECTOMY SNARE: Brand: LASSO

## (undated) DEVICE — Device

## (undated) DEVICE — CONMED SCOPE SAVER BITE BLOCK, 20X27 MM: Brand: SCOPE SAVER

## (undated) DEVICE — GIJAW SINGLE-USE BIOPSY FORCEPS WITH NEEDLE: Brand: GIJAW

## (undated) DEVICE — 60 ML SYRINGE REGULAR TIP: Brand: MONOJECT

## (undated) DEVICE — TRAP POLYP W/ 2 SPEC TY CLR MAGNIFYING WIND

## (undated) DEVICE — KIT ENDO ORCAPOD 160/180/190

## (undated) DEVICE — MEDI-VAC NON-CONDUCTIVE SUCTION TUBING 6MM X 1.8M (6FT.) L: Brand: CARDINAL HEALTH

## (undated) DEVICE — KIT CLEAN ENDOKIT 1.1OZ GOWNX2

## (undated) DEVICE — YANKAUER,BULB TIP,W/O VENT,RIGID,STERILE: Brand: MEDLINE

## (undated) NOTE — LETTER
[facility]  [address], [place], [place]    Authorization for Surgical Operation and Procedure                               I hereby authorize Estevan Aguilar MD, my physician and his/her assistants (if applicable), which may include medical students, residents, and/or fellows, to perform the following surgical operation/ procedure and administer such anesthesia as may be determined necessary by my physician: Operation/Procedure name (s) ESOPHAGOGASTRODUODENOSCOPY on Yara Westfall   2.   I recognize that during the surgical operation/procedure, unforeseen conditions may necessitate additional or different procedures than those listed above.  I, therefore, further authorize and request that the above-named surgeon, assistants, or designees perform such procedures as are, in their judgment, necessary and desirable.    3.   My surgeon/physician has discussed prior to my surgery the potential benefits, risks and side effects of this procedure; the likelihood of achieving goals; and potential problems that might occur during recuperation.  They also discussed reasonable alternatives to the procedure, including risks, benefits, and side effects related to the alternatives and risks related to not receiving this procedure.  I have had all my questions answered and I acknowledge that no guarantee has been made as to the result that may be obtained.    4.   Should the need arise during my operation/procedure, which includes change of level of care prior to discharge, I also consent to the administration of blood and/or blood products.  Further, I understand that despite careful testing and screening of blood or blood products by collecting agencies, I may still be subject to ill effects as a result of receiving a blood transfusion and/or blood products.  The following are some, but not all, of the potential risks that can occur: fever and allergic reactions, hemolytic reactions, transmission  of diseases such as Hepatitis, AIDS and Cytomegalovirus (CMV) and fluid overload.  In the event that I wish to have an autologous transfusion of my own blood, or a directed donor transfusion, I will discuss this with my physician.  Check only if Refusing Blood or Blood Products  I understand refusal of blood or blood products as deemed necessary by my physician may have serious consequences to my condition to include possible death. I hereby assume responsibility for my refusal and release the hospital, its personnel, and my physicians from any responsibility for the consequences of my refusal.    o  Refuse   5.   I authorize the use of any specimen, organs, tissues, body parts or foreign objects that may be removed from my body during the operation/procedure for diagnosis, research or teaching purposes and their subsequent disposal by hospital authorities.  I also authorize the release of specimen test results and/or written reports to my treating physician on the hospital medical staff or other referring or consulting physicians involved in my care, at the discretion of the Pathologist or my treating physician.    6.   I consent to the photographing or videotaping of the operations or procedures to be performed, including appropriate portions of my body for medical, scientific, or educational purposes, provided my identity is not revealed by the pictures or by descriptive texts accompanying them.  If the procedure has been photographed/videotaped, the surgeon will obtain the original picture, image, videotape or CD.  The hospital will not be responsible for storage, release or maintenance of the picture, image, tape or CD.    7.   I consent to the presence of a  or observers in the operating room as deemed necessary by my physician or their designees.    8.   I recognize that in the event my procedure results in extended X-Ray/fluoroscopy time, I may develop a skin reaction.    9. If I have a Do  Not Attempt Resuscitation (DNAR) order in place, that status will be suspended while in the operating room, procedural suite, and during the recovery period unless otherwise explicitly stated by me (or a person authorized to consent on my behalf). The surgeon or my attending physician will determine when the applicable recovery period ends for purposes of reinstating the DNAR order.  10. Patients having a sterilization procedure: I understand that if the procedure is successful the results will be permanent and it will therefore be impossible for me to inseminate, conceive, or bear children.  I also understand that the procedure is intended to result in sterility, although the result has not been guaranteed.   11. I acknowledge that my physician has explained sedation/analgesia administration to me including the risk and benefits I consent to the administration of sedation/analgesia as may be necessary or desirable in the judgment of my physician.    I CERTIFY THAT I HAVE READ AND FULLY UNDERSTAND THE ABOVE CONSENT TO OPERATION and/or OTHER PROCEDURE.     ____________________________________  _________________________________        ______________________________  Signature of Patient    Signature of Responsible Person                Printed Name of Responsible Person                                      ____________________________________  _____________________________                ________________________________  Signature of Witness        Date  Time         Relationship to Patient    STATEMENT OF PHYSICIAN My signature below affirms that prior to the time of the procedure; I have explained to the patient and/or his/her legal representative, the risks and benefits involved in the proposed treatment and any reasonable alternative to the proposed treatment. I have also explained the risks and benefits involved in refusal of the proposed treatment and alternatives to the proposed treatment and have answered the  patient's questions. If I have a significant financial interest in a co-management agreement or a significant financial interest in any product or implant, or other significant relationship used in this procedure/surgery, I have disclosed this and had a discussion with my patient.     _____________________________________________________              _____________________________  (Signature of Physician)                                                                                         (Date)                                   (Time)  Patient Name: Yara Westfall      : 1970      Printed: 4/3/2025     Medical Record #: [id number]                                      Page 1 of 1

## (undated) NOTE — LETTER
Label Here    [facility]   PSC HOLD-REQ   [employer]              [facility]  TM PSC HOLD-REQ - [employer]     Account #: [id number] Order Date: 4/13/2024   Bill Type: Third-Party     LAB REF #: [id number] Order Time: 11:16 AM           Client / Ordering Site Information: Physician Information:   Account Name: [employer] [facility]   Address 1: [address] [place] [address]   Address 2:     [facility] Zip: [place] [address]   Phone: [phone]   Fax: [phone]    Ordering: Nan Hurtado   Degree: MD   NPI: [id number]   UPIN:     Physician ID:           Patient Information:   Name: HUGH SANZ   Legal Sex: Female   SSN: [id number]   Patient ID: [id number]    YOB: 1970 (53 years)   Phone: [phone]   Address: [address] [place] [address]            Responsible Party / Guarantor Information:   Name: HUGH SANZ   Address: [address] [place] [address] Zip: [place] [address]   Phone: [phone]   Relation to Pt: Self   Employer Name: [employer]         ABN:      Worker's Comp: N Date of Injury:              Insurance Information:   Primary Insurance: Secondary Insurance:   Ins Co Name: [employer]   Address 1: [facility] [address]   Address 2:     [facility] Zip: [place] [address]   Policy Number: [id number]   Group #: [id number]    Ins Co Name:     Address 1:     Address 2:     City, State Zip:     Policy Number:     Group #:        Primary Policy [place] / Insured: Secondary Policy [place] / Insured:   Name: HUGH SANZ   Address: [address] [place] [address]   Pt Relation to Subscriber: Self    Name:     Address:          Pt Relation to Subscriber:              Specimen Type:   (964) Rubeola(Measles)Antibodies, IGG-Immunity:   Blood    (4439) Varicella Zoster, IGG:   Blood      (14264) TSH W Reflex To Free T4:   Blood    (1759) CBC, Platelet; No Differential:   Blood      (05009) Comp Metabolic  Panel (14):   Blood    (496) Hemoglobin A1C:   Blood      (7600) Lipid Panel:   Blood    (5463) Urinalysis, Routine:   Urine      (92878) VITAMIN D, SCREEN [92811][Q]:   Blood             Comments:         Order Code Tests Ordered (Total: 9)    Order Code Tests Ordered      964 Rubeola(Measles)Antibodies, IGG-Immunity   4439 Varicella Zoster, IGG   74982 TSH W Reflex To Free T4   1759 CBC, Platelet; No Differential   70648 Comp Metabolic Panel (14)    496 Hemoglobin A1C   7600 Lipid Panel   5463 Urinalysis, Routine   31333 VITAMIN D, SCREEN [80954][Q]           Diagnosis Codes:   Z01.84 Z00.00               Bill Type: Third-Party    Ins Code: Not on file                                        Document electronically generated by:  Leslie PANIAGUA RN

## (undated) NOTE — LETTER
[facility]  Administration of Anesthesia  IYara agree to be cared for by a physician anesthesiologist alone and/or with a nurse anesthetist, who is specially trained to monitor me and give me medicine to put me to sleep or keep me comfortable during my procedure    I understand that my anesthesiologist and/or anesthetist is not an employee or agent of [facility] or [employer]. He or she works for [facility]    As the patient asking for anesthesia services, I agree to:  Allow the anesthesiologist (anesthesia doctor) to give me medicine and do additional procedures as necessary. Some examples are: Starting or using an “IV” to give me medicine, fluids or blood during my procedure, and having a breathing tube placed to help me breathe when I’m asleep (intubation). In the event that my heart stops working properly, I understand that my anesthesiologist will make every effort to sustain my life, unless otherwise directed by [facility] Do Not Resuscitate documents.  Tell my anesthesia doctor before my procedure:  If I am pregnant.  The last time that I ate or drank.  iii. All of the medicines I take (including prescriptions, herbal supplements, and pills I can buy without a prescription (including street drugs/illegal medications). Failure to inform my anesthesiologist about these medicines may increase my risk of anesthetic complications.  iv.If I am allergic to anything or have had a reaction to anesthesia before.  I understand how the anesthesia medicine will help me (benefits).  I understand that with any type of anesthesia medicine there are risks:  The most common risks are: nausea, vomiting, sore throat, muscle soreness, damage to my eyes, mouth, or teeth (from breathing tube placement).  Rare risks include: remembering what happened during my procedure, allergic reactions to medications, injury to my airway, heart, lungs, vision, nerves, or  muscles and in extremely rare instances death.  My doctor has explained to me other choices available to me for my care (alternatives).  Pregnant Patients (“epidural”):  I understand that the risks of having an epidural (medicine given into my back to help control pain during labor), include itching, low blood pressure, difficulty urinating, headache or slowing of the baby’s heart. Very rare risks include infection, bleeding, seizure, irregular heart rhythms and nerve injury.  Regional Anesthesia (“spinal”, “epidural”, & “nerve blocks”):  I understand that rare but potential complications include headache, bleeding, infection, seizure, irregular heart rhythms, and nerve injury.    _____________________________________________________________________________  Patient (or Representative) Signature/Relationship to Patient  Date   Time    _____________________________________________________________________________   Name (if used)    Language/Organization   Time    _____________________________________________________________________________  Nurse Anesthetist Signature     Date   Time  _____________________________________________________________________________  Anesthesiologist Signature     Date   Time  I have discussed the procedure and information above with the patient (or patient’s representative) and answered their questions. The patient or their representative has agreed to have anesthesia services.    _____________________________________________________________________________  Witness        Date   Time  I have verified that the signature is that of the patient or patient’s representative, and that it was signed before the procedure  Patient Name: Yara Westfall     : 1970                 Printed: 4/3/2025 at 7:17 AM    Medical Record #: [id number]                                            Page 1 of 1  ----------ANESTHESIA CONSENT----------